# Patient Record
Sex: FEMALE | Race: WHITE | NOT HISPANIC OR LATINO | Employment: UNEMPLOYED | URBAN - NONMETROPOLITAN AREA
[De-identification: names, ages, dates, MRNs, and addresses within clinical notes are randomized per-mention and may not be internally consistent; named-entity substitution may affect disease eponyms.]

---

## 2017-02-03 NOTE — TELEPHONE ENCOUNTER
----- Message from Jami Jones sent at 2/3/2017  3:03 PM EST -----  Contact: PATIENT  PT NEEDS REFILL OF BIRTH CONTROL. I SCHEDULED HER FOR AN ANNUAL IN MARCH      [facility]      ? [facility] - SCHEDULED WITH AJAY

## 2017-02-06 RX ORDER — NORGESTIMATE AND ETHINYL ESTRADIOL 7DAYSX3 28
1 KIT ORAL DAILY
Qty: 28 TABLET | Refills: 0 | Status: SHIPPED | OUTPATIENT
Start: 2017-02-06 | End: 2017-07-19

## 2017-04-21 VITALS
RESPIRATION RATE: 22 BRPM | DIASTOLIC BLOOD PRESSURE: 72 MMHG | HEIGHT: 62 IN | BODY MASS INDEX: 23.92 KG/M2 | SYSTOLIC BLOOD PRESSURE: 105 MMHG | HEART RATE: 120 BPM | TEMPERATURE: 98.2 F | OXYGEN SATURATION: 100 % | WEIGHT: 130 LBS

## 2017-04-21 PROCEDURE — 99283 EMERGENCY DEPT VISIT LOW MDM: CPT

## 2017-04-22 ENCOUNTER — HOSPITAL ENCOUNTER (EMERGENCY)
Facility: HOSPITAL | Age: 22
Discharge: HOME | End: 2017-04-22
Attending: EMERGENCY MEDICINE | Admitting: EMERGENCY MEDICINE

## 2017-04-22 DIAGNOSIS — N39.0 URINARY TRACT INFECTION WITHOUT HEMATURIA, SITE UNSPECIFIED: Primary | ICD-10-CM

## 2017-04-22 DIAGNOSIS — R51.9 NONINTRACTABLE HEADACHE, UNSPECIFIED CHRONICITY PATTERN, UNSPECIFIED HEADACHE TYPE: ICD-10-CM

## 2017-04-22 LAB
BACTERIA UR QL AUTO: ABNORMAL /HPF
BILIRUB UR QL STRIP: NEGATIVE
CLARITY UR: CLEAR
COLOR UR: YELLOW
GLUCOSE UR STRIP-MCNC: NEGATIVE MG/DL
HGB UR QL STRIP.AUTO: ABNORMAL
HYALINE CASTS UR QL AUTO: ABNORMAL /LPF
KETONES UR QL STRIP: NEGATIVE
LEUKOCYTE ESTERASE UR QL STRIP.AUTO: ABNORMAL
NITRITE UR QL STRIP: NEGATIVE
PH UR STRIP.AUTO: 7.5 [PH] (ref 5–8)
PROT UR QL STRIP: ABNORMAL
RBC # UR: ABNORMAL /HPF
REF LAB TEST METHOD: ABNORMAL
SP GR UR STRIP: 1.01 (ref 1–1.03)
SQUAMOUS #/AREA URNS HPF: ABNORMAL /HPF
UROBILINOGEN UR QL STRIP: ABNORMAL
WBC UR QL AUTO: ABNORMAL /HPF

## 2017-04-22 PROCEDURE — 81001 URINALYSIS AUTO W/SCOPE: CPT | Performed by: EMERGENCY MEDICINE

## 2017-04-22 PROCEDURE — 87086 URINE CULTURE/COLONY COUNT: CPT | Performed by: EMERGENCY MEDICINE

## 2017-04-22 PROCEDURE — 25010000002 KETOROLAC TROMETHAMINE PER 15 MG: Performed by: EMERGENCY MEDICINE

## 2017-04-22 PROCEDURE — 96372 THER/PROPH/DIAG INJ SC/IM: CPT

## 2017-04-22 PROCEDURE — 87186 SC STD MICRODIL/AGAR DIL: CPT | Performed by: EMERGENCY MEDICINE

## 2017-04-22 PROCEDURE — 87077 CULTURE AEROBIC IDENTIFY: CPT | Performed by: EMERGENCY MEDICINE

## 2017-04-22 RX ORDER — CEFUROXIME AXETIL 250 MG/1
250 TABLET ORAL EVERY 12 HOURS SCHEDULED
Status: DISCONTINUED | OUTPATIENT
Start: 2017-04-22 | End: 2017-04-22 | Stop reason: HOSPADM

## 2017-04-22 RX ORDER — KETOROLAC TROMETHAMINE 30 MG/ML
60 INJECTION, SOLUTION INTRAMUSCULAR; INTRAVENOUS ONCE
Status: COMPLETED | OUTPATIENT
Start: 2017-04-22 | End: 2017-04-22

## 2017-04-22 RX ORDER — NAPROXEN 500 MG/1
500 TABLET ORAL 2 TIMES DAILY PRN
Qty: 14 TABLET | Refills: 0 | Status: SHIPPED | OUTPATIENT
Start: 2017-04-22 | End: 2017-07-19

## 2017-04-22 RX ORDER — CEFUROXIME AXETIL 250 MG/1
250 TABLET ORAL 2 TIMES DAILY
Qty: 20 TABLET | Refills: 0 | Status: SHIPPED | OUTPATIENT
Start: 2017-04-22 | End: 2017-07-19

## 2017-04-22 RX ORDER — BUTALBITAL, ACETAMINOPHEN AND CAFFEINE 50; 325; 40 MG/1; MG/1; MG/1
1 TABLET ORAL EVERY 6 HOURS PRN
Qty: 12 TABLET | Refills: 0 | Status: SHIPPED | OUTPATIENT
Start: 2017-04-22 | End: 2017-07-19

## 2017-04-22 RX ADMIN — KETOROLAC TROMETHAMINE 60 MG: 30 INJECTION, SOLUTION INTRAMUSCULAR at 01:03

## 2017-04-22 RX ADMIN — CEFUROXIME AXETIL 250 MG: 250 TABLET ORAL at 01:36

## 2017-04-22 NOTE — ED PROVIDER NOTES
TRIAGE CHIEF COMPLAINT:     Nursing and triage notes reviewed    Chief Complaint   Patient presents with   • Difficulty Urinating      HPI: Alyson James is a 21 y.o. female who presents to the emergency department complaining of difficulty and pain with urination for the past 2 days.  Patient states today she is started to develop some bilateral flank discomfort.  She is also had a diffuse pounding headache most prominent in the back of her head for the past 2 days as well.  Headache was gradual in onset.  Patient states that she has not taken anything for the pain in her back or her head since it began.  Small amount of hematuria this morning.  Denies fevers but does feel chilled.     REVIEW OF SYSTEMS: Otherwise negative unless stated above     PAST MEDICAL HISTORY:   Past Medical History:   Diagnosis Date   • Gout    • Kidney infection         FAMILY HISTORY:   Family History   Problem Relation Age of Onset   • Cancer Mother    • Arthritis Father    • Cancer Father    • Diabetes Paternal Aunt    • Migraines Paternal Aunt    • Diabetes Paternal Grandmother         SOCIAL HISTORY:   Social History     Social History   • Marital status: Single     Spouse name: N/A   • Number of children: N/A   • Years of education: N/A     Occupational History   • Not on file.     Social History Main Topics   • Smoking status: Current Every Day Smoker   • Smokeless tobacco: Not on file   • Alcohol use No   • Drug use: No   • Sexual activity: Not on file     Other Topics Concern   • Not on file     Social History Narrative        SURGICAL HISTORY:   History reviewed. No pertinent surgical history.     CURRENT MEDICATIONS:      Medication List      ASK your doctor about these medications          * TRINESSA (28) 0.18/0.215/0.25 MG-35 MCG per tablet   Generic drug:  norgestimate-ethinyl estradiol       * norgestimate-ethinyl estradiol 0.18/0.215/0.25 MG-35 MCG per tablet   Commonly known as:  TRINESSA (28)   Take 1 tablet  by mouth Daily.       * Notice:  This list has 2 medication(s) that are the same as other   medications prescribed for you. Read the directions carefully, and ask   your doctor or other care provider to review them with you.         ALLERGIES: Review of patient's allergies indicates no known allergies.     PHYSICAL EXAM:   VITAL SIGNS:   Vitals:    04/21/17 2320   BP: 105/72   Pulse: 120   Resp: 22   Temp: 98.2 °F (36.8 °C)   SpO2: 100%      CONSTITUTIONAL: Awake, oriented, appears non-toxic   HENT: Atraumatic, normocephalic, oral mucosa pink and moist, airway patent.   EYES: Conjunctiva clear   NECK: Trachea midline, non-tender, supple   CARDIOVASCULAR: Normal heart rate, Normal rhythm, No murmurs, rubs, gallops   PULMONARY/CHEST: Clear to auscultation, no rhonchi, wheezes, or rales. Symmetrical breath sounds. Non-tender.   ABDOMINAL: Non-distended, soft, non-tender - no rebound or guarding.  BACK: While bilateral flank discomfort to palpation.   NEUROLOGIC: Non-focal, moving all four extremities, no gross sensory or motor deficits.  Strength and sensation in the upper and lower extremities bilaterally.  EXTREMITIES: No clubbing, cyanosis, or edema   SKIN: Warm, Dry, No erythema, No rash     ED COURSE / MEDICAL DECISION MAKING:   Alyson James is a 21 y.o. female who presents to the emergency department for evaluation of pain with urination and headache.  Patient appears mildly uncomfortable on arrival and slightly tachycardic.  Vital signs otherwise unremarkable.  Urinalysis obtained reveals signs of a urinary tract infection.  I suspect her symptoms are all secondary of her UTI.  I don't feel imaging or laboratory tests are currently indicated.  Don't feel imaging the head is currently indicated.  Patient had symptomatic improvement with Toradol.  Neurological exam is unremarkable and no concern for subarachnoid hemorrhage or meningitis at this time.  We'll treat symptomatically and with antibiotic's  for the urinary infection and have patient follow-up with her primary care physician or return for worsening symptoms.    DECISION TO DISCHARGE/ADMIT: see ED care timeline     FINAL IMPRESSION:   1 -- urinary tract infection   2 -- headache  3 --     Electronically signed by: Paige Marcos MD, 4/22/2017 12:54 AM       Paige Marcos MD  04/22/17 0130

## 2017-04-22 NOTE — DISCHARGE INSTRUCTIONS
"Most recent vital signs: /72 (Patient Position: Sitting)  Pulse 120  Temp 98.2 °F (36.8 °C) (Oral)   Resp 22  Ht 62\" (157.5 cm)  Wt 130 lb (59 kg)  LMP 04/02/2017 (Exact Date)  SpO2 100%  BMI 23.78 kg/m2     Below you fill find any summaries of imaging results and further discharge instructions as discussed during your visit.     No orders to display        --PLEASE READ THESE DIRECTIONS IN FULL--     Our goal is to provide the best care we can AND to find any medical or surgical emergency while you are in the ER. If a medical or surgical emergency was not identified during your visit (or if the issue was resolved during the visit), following these directions for follow-up with a primary care provider and/or specialists and following the return precautions will be the safest and most effective way to protect your health after leaving the emergency room.     Therefore, in addition to the conversation we had in the room, please read all of the information in these discharge instructions, take medications as directed, and follow-up as directed.     You should seek immediate medical help for:     Worsening pain that is not helped with prescribed pain medicines and/or the recommended doses of over the counter pain medicines such as acetaminophen (Tylenol) or ibuprofen (Motrin/Advil)*.   New or worsening chest pain or trouble breathing   New or worsening headache, confusion, weakness, or visual changes   New or worsening fever (>100.4 F) that does not improve with the recommended doses of over the counter fever treatments such as acetaminophen (Tylenol) or ibuprofen (Motrin/Advil)* for more than a few hours.   Any other concerns that you feel could be life, limb, or eye-sight threatening     As a reminder, if you received any medicines or prescriptions for medicines that may be sedating (\"narcotics\", \"opioids\"), do NOT:   - operate any vehicles   - take if you are already tired   - take if you have had or " plan to have alcohol   - perform other responsibilities that require your full attention.     Common medications include, but are not limited to:   Opiates: Morphine, Norco, Lortab, Vicodin, Percocet, oxycodone, hydrocodone, Dilaudid, hydromorphone   Benzodiazepines: Ativan, Valium, alprazolam, lorazepam, diazepam,   Other sedating medications: promethazine, Phenergan, trazodone, Benadryl, hydroxyzine, Vistaril, diphenhydramine, zolpidem, and Ambien     *If you have any history of GI (stomach/intestinal) bleeding, allergic reactions, kidney problems, and/or certain heart problems or there is any chance you could be pregnant, and have been told to avoid NSAIDs (ibuprofen, naproxen, Aleve, Motrin, Advil) please avoid these medications. Please remember that prescribed pain medicines often contain Tylenol/acetaminophen, so make sure your total daily (24 hour) intake does not exceed 4 grams or 4000mg.

## 2017-04-24 LAB — BACTERIA SPEC AEROBE CULT: ABNORMAL

## 2017-07-19 ENCOUNTER — OFFICE VISIT (OUTPATIENT)
Dept: OBSTETRICS AND GYNECOLOGY | Facility: CLINIC | Age: 22
End: 2017-07-19

## 2017-07-19 VITALS
WEIGHT: 117 LBS | DIASTOLIC BLOOD PRESSURE: 62 MMHG | BODY MASS INDEX: 21.53 KG/M2 | HEIGHT: 62 IN | SYSTOLIC BLOOD PRESSURE: 124 MMHG

## 2017-07-19 DIAGNOSIS — Z3A.08 8 WEEKS GESTATION OF PREGNANCY: Primary | ICD-10-CM

## 2017-07-19 PROCEDURE — 99213 OFFICE O/P EST LOW 20 MIN: CPT | Performed by: NURSE PRACTITIONER

## 2017-07-19 RX ORDER — PROMETHAZINE HYDROCHLORIDE 25 MG/1
TABLET ORAL
Refills: 3 | COMMUNITY
Start: 2017-07-13 | End: 2018-02-12 | Stop reason: HOSPADM

## 2017-07-19 RX ORDER — CEPHALEXIN 500 MG/1
CAPSULE ORAL
Refills: 0 | COMMUNITY
Start: 2017-07-10 | End: 2017-08-21

## 2017-07-19 RX ORDER — HYDROCODONE BITARTRATE AND ACETAMINOPHEN 10; 325 MG/1; MG/1
TABLET ORAL
Refills: 0 | COMMUNITY
Start: 2017-07-13 | End: 2017-08-21

## 2017-07-19 RX ORDER — ONDANSETRON 8 MG/1
TABLET, ORALLY DISINTEGRATING ORAL
Refills: 3 | COMMUNITY
Start: 2017-07-13 | End: 2017-09-19

## 2017-07-19 RX ORDER — PRENATAL VIT NO.126/IRON/FOLIC 28MG-0.8MG
TABLET ORAL DAILY
COMMUNITY
End: 2020-02-21 | Stop reason: SDUPTHER

## 2017-07-19 NOTE — PROGRESS NOTES
"Chief Complaint   Patient presents with   • confirmation of pregnancy     LMP 17, was seen in [facility] ED for kidney stones, had scan there on 7/10/17 with IUP measuring 6w5d     Alyson James is a 21 y.o. year old  presenting to be seen for + pregnancy - EDC  = 8 2/7 wks gestation  C/O lower adm cramping and \"knot in stomach at around umbilcus - has been there since delivery in  - knot remains the same  C/O 1st trimester discomforts of pregnancy - including n/v - fatigue - lizama and irritability - some   Seen ER with back pain - rec'd antibiotics and pain meds - hydrocodone - some improvement  - has appt with Dr. 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     Previous pregnancy at [facility] 8#4 -no complications - no vac or forceps     Past Medical History:   Diagnosis Date   • Abnormal Pap smear of cervix    • Bladder infection    • Gout    • Kidney infection         Current Outpatient Prescriptions:   •  cephalexin (KEFLEX) 500 MG capsule, TK ONE C PO  QID, Disp: , Rfl: 0  •  HYDROcodone-acetaminophen (NORCO)  MG per tablet, TK 1 T PO Q 6 HOURS PRF PAIN, Disp: , Rfl: 0  •  ondansetron ODT (ZOFRAN-ODT) 8 MG disintegrating tablet, DIS 1 T PO Q 6 HOURS N, Disp: , Rfl: 3  •  Prenatal Vit-Fe Fumarate-FA (PRENATAL, CLASSIC, VITAMIN) 28-0.8 MG tablet tablet, Take  by mouth Daily., Disp: , Rfl:   •  promethazine (PHENERGAN) 25 MG tablet, TK 1 T PO Q 6 HOURS  N AND VOMITING, Disp: , Rfl: 3   No Known Allergies   Past Surgical History:   Procedure Laterality Date   • VAGINAL DELIVERY        Social History     Social History   • Marital status: Single     Spouse name: N/A   • Number of children: N/A   • Years of education: N/A     Occupational History   • Not on file.     Social History Main Topics   • Smoking status: Current Every Day Smoker   • Smokeless tobacco: Never Used   • Alcohol " "use No   • Drug use: No   • Sexual activity: Yes     Birth control/ protection: None     Other Topics Concern   • Not on file     Social History Narrative      Family History   Problem Relation Age of Onset   • Cancer Mother    • Arthritis Father    • Cancer Father    • Diabetes Paternal Aunt    • Migraines Paternal Aunt    • Diabetes Paternal Grandmother        The following portions of the patient's history were reviewed and updated as appropriate:problem list, current medications, allergies, past family history, past medical history, past social history and past surgical history.    Review of Systems  Pertinent items are noted in HPI, all other systems reviewed and negative    Objective    /62  Ht 62\" (157.5 cm)  Wt 117 lb (53.1 kg)  LMP 04/23/2017  Breastfeeding? No  BMI 21.4 kg/m2    Physical Exam  Psych: Altert and oriented to time, place and person  Mood and affect appropriate   General: well developed; well nourished  no acute distress   HEENT -  Tooth decay   Neck: The neck is supple and the trachea is midline  Lungs:  breathing is unlabored  Back: Neg CVAT  Abdomen: Soft, non-tender, no organomegaly  Lower Extremities: Full ROM      REVIEW OF CHART FROM ER  + IUP with [facility] 2-28-18  Renal stone and mild rt hydronephrosis        Assessment   IUP 1st trimester   Renal stone and mild hydronephrosis  Tooth decay      Plan  Keep appt with Dr. Marck Chaudhary NOB labs with option CF screening  Routine NOB education - including travel - nutrition - OTC meds - adeq rest and fluids   Encourage to decrease smoking or stop  Discussed [employer] - enc to schedule appt   Continue PNV   Office 4 wks call prn       This note was electronically signed.    Mally Russell CNM   July 19, 2017  "

## 2017-07-20 LAB
ABO GROUP BLD: (no result)
BASOPHILS # BLD AUTO: 0 X10E3/UL (ref 0–0.2)
BASOPHILS NFR BLD AUTO: 0 %
BLD GP AB SCN SERPL QL: NEGATIVE
EOSINOPHIL # BLD AUTO: 0.2 X10E3/UL (ref 0–0.4)
EOSINOPHIL NFR BLD AUTO: 3 %
ERYTHROCYTE [DISTWIDTH] IN BLOOD BY AUTOMATED COUNT: 13.9 % (ref 12.3–15.4)
HBV SURFACE AG SERPL QL IA: NEGATIVE
HCT VFR BLD AUTO: 40.4 % (ref 34–46.6)
HGB BLD-MCNC: 13.7 G/DL (ref 11.1–15.9)
HIV 1+2 AB+HIV1 P24 AG SERPL QL IA: NON REACTIVE
IMM GRANULOCYTES # BLD: 0 X10E3/UL (ref 0–0.1)
IMM GRANULOCYTES NFR BLD: 0 %
LYMPHOCYTES # BLD AUTO: 1.5 X10E3/UL (ref 0.7–3.1)
LYMPHOCYTES NFR BLD AUTO: 21 %
MCH RBC QN AUTO: 29.5 PG (ref 26.6–33)
MCHC RBC AUTO-ENTMCNC: 33.9 G/DL (ref 31.5–35.7)
MCV RBC AUTO: 87 FL (ref 79–97)
MONOCYTES # BLD AUTO: 0.4 X10E3/UL (ref 0.1–0.9)
MONOCYTES NFR BLD AUTO: 6 %
NEUTROPHILS # BLD AUTO: 5.2 X10E3/UL (ref 1.4–7)
NEUTROPHILS NFR BLD AUTO: 70 %
PLATELET # BLD AUTO: 218 X10E3/UL (ref 150–379)
RBC # BLD AUTO: 4.65 X10E6/UL (ref 3.77–5.28)
RH BLD: NEGATIVE
RPR SER QL: NON REACTIVE
RUBV IGG SERPL IA-ACNC: 3.09 INDEX
WBC # BLD AUTO: 7.3 X10E3/UL (ref 3.4–10.8)

## 2017-08-21 ENCOUNTER — TELEPHONE (OUTPATIENT)
Dept: OBSTETRICS AND GYNECOLOGY | Facility: CLINIC | Age: 22
End: 2017-08-21

## 2017-08-21 ENCOUNTER — ROUTINE PRENATAL (OUTPATIENT)
Dept: OBSTETRICS AND GYNECOLOGY | Facility: CLINIC | Age: 22
End: 2017-08-21

## 2017-08-21 VITALS — SYSTOLIC BLOOD PRESSURE: 118 MMHG | BODY MASS INDEX: 21.95 KG/M2 | WEIGHT: 120 LBS | DIASTOLIC BLOOD PRESSURE: 58 MMHG

## 2017-08-21 DIAGNOSIS — Z34.92 NORMAL PREGNANCY, SECOND TRIMESTER: ICD-10-CM

## 2017-08-21 DIAGNOSIS — Z3A.14 14 WEEKS GESTATION OF PREGNANCY: Primary | ICD-10-CM

## 2017-08-21 PROCEDURE — 99213 OFFICE O/P EST LOW 20 MIN: CPT | Performed by: NURSE PRACTITIONER

## 2017-08-21 NOTE — PROGRESS NOTES
"Chief Complaint   Patient presents with   • Routine Prenatal Visit     \"knot in stomach\", cramping        HPI  , 12w5d reports occas cramp - no bleeding - no cramp today   FOB has Baltazar parkinson white syndrome     ROS:  GI: Nausea - No; Constipation - No; Diarrhea - No    Neuro: Headache - No; Visual change - No        EXAM  General Appearance: relaxed  Lungs: Breathing unlabored  Abdomen:  Soft and non-tender - See flow sheet FHT's  LE: Neg edema    MDM  Impression: Supervision of pregnancy    Tests done today: none   Topics discussed: option for MSAFP next visit,  will schedule appt with PDC due to FOB + WPWS   Tests next visit: none     OB History      Para Term  AB TAB SAB Ectopic Multiple Living    2 1 1       1          Past Medical History:   Diagnosis Date   • Abnormal Pap smear of cervix    • Bladder infection    • Gout    • Kidney infection        Past Surgical History:   Procedure Laterality Date   • VAGINAL DELIVERY         Family History   Problem Relation Age of Onset   • Cancer Mother    • Arthritis Father    • Cancer Father    • Diabetes Paternal Aunt    • Migraines Paternal Aunt    • Diabetes Paternal Grandmother        Social History     Social History   • Marital status: Single     Spouse name: N/A   • Number of children: N/A   • Years of education: N/A     Occupational History   • Not on file.     Social History Main Topics   • Smoking status: Current Every Day Smoker   • Smokeless tobacco: Never Used   • Alcohol use No   • Drug use: No   • Sexual activity: Yes     Birth control/ protection: None     Other Topics Concern   • Not on file     Social History Narrative     "

## 2017-08-21 NOTE — TELEPHONE ENCOUNTER
Pt was seen in office today, called after she left stating that she was sick and had missed work on 8/4/17. She is requesting an excuse for that day. Can we give her one?

## 2017-08-25 ENCOUNTER — APPOINTMENT (OUTPATIENT)
Dept: WOMENS IMAGING | Facility: HOSPITAL | Age: 22
End: 2017-08-25

## 2017-08-31 ENCOUNTER — APPOINTMENT (OUTPATIENT)
Dept: WOMENS IMAGING | Facility: HOSPITAL | Age: 22
End: 2017-08-31

## 2017-08-31 ENCOUNTER — TELEPHONE (OUTPATIENT)
Dept: OBSTETRICS AND GYNECOLOGY | Facility: CLINIC | Age: 22
End: 2017-08-31

## 2017-09-19 ENCOUNTER — ROUTINE PRENATAL (OUTPATIENT)
Dept: OBSTETRICS AND GYNECOLOGY | Facility: CLINIC | Age: 22
End: 2017-09-19

## 2017-09-19 VITALS — DIASTOLIC BLOOD PRESSURE: 60 MMHG | WEIGHT: 126 LBS | BODY MASS INDEX: 23.05 KG/M2 | SYSTOLIC BLOOD PRESSURE: 120 MMHG

## 2017-09-19 DIAGNOSIS — Z34.82 ENCOUNTER FOR SUPERVISION OF OTHER NORMAL PREGNANCY IN SECOND TRIMESTER: Primary | ICD-10-CM

## 2017-09-19 DIAGNOSIS — K59.00 CONSTIPATION DURING PREGNANCY, SECOND TRIMESTER: ICD-10-CM

## 2017-09-19 DIAGNOSIS — O99.612 CONSTIPATION DURING PREGNANCY, SECOND TRIMESTER: ICD-10-CM

## 2017-09-19 PROBLEM — Z34.90 SUPERVISION OF NORMAL PREGNANCY: Status: ACTIVE | Noted: 2017-09-19

## 2017-09-19 PROCEDURE — 99213 OFFICE O/P EST LOW 20 MIN: CPT | Performed by: MIDWIFE

## 2017-09-19 RX ORDER — HYDROCODONE BITARTRATE AND ACETAMINOPHEN 7.5; 325 MG/1; MG/1
TABLET ORAL
Refills: 0 | COMMUNITY
Start: 2017-08-29 | End: 2017-09-19

## 2017-09-19 NOTE — PROGRESS NOTES
Chief Complaint   Patient presents with   • Routine Prenatal Visit     mild cramping        HPI: Alyson is a  currently at 16w6d who today reports the following: C/O mild cramping and constipation for the past several weeks. Smoking 5-10 cigs daily   Leaking - No; Heartburn - No.    ROS:   GI:   Nausea - YES; Constipation - YES;    Neuro:  Headache - No; Visual disturbances - No.    EXAM:   Vitals:  See prenatal flowsheet, /60, Wt +6#   Abdomen:   See prenatal flowsheet, soft, nontender, fundus @ U-3   Pelvic:  See prenatal flowsheet   Urine:  See prenatal flowsheet    Lab Results   Component Value Date    ABO B 2017    RH Negative 2017    ABSCRN Negative 2017       MDM:  Impression: Supervision of low risk pregnancy  Constipation in pregnancy   Tests done today: Declined Quad screen   Topics discussed: Constipation, increase water and fiber in diet. Stool softeners or fiber gummies BID PRN, May use fleets enema PRN   Smoking in pregnancy   Tests next visit: U/S for anatomic screening                RTO:                        3 weeks per pt request    This note was electronically signed.  Yani Barriga, APRN  2017

## 2017-10-10 ENCOUNTER — ROUTINE PRENATAL (OUTPATIENT)
Dept: OBSTETRICS AND GYNECOLOGY | Facility: CLINIC | Age: 22
End: 2017-10-10

## 2017-10-10 VITALS — BODY MASS INDEX: 23.59 KG/M2 | SYSTOLIC BLOOD PRESSURE: 110 MMHG | DIASTOLIC BLOOD PRESSURE: 70 MMHG | WEIGHT: 129 LBS

## 2017-10-10 DIAGNOSIS — Z34.82 ENCOUNTER FOR SUPERVISION OF OTHER NORMAL PREGNANCY IN SECOND TRIMESTER: Primary | ICD-10-CM

## 2017-10-10 DIAGNOSIS — F17.210 CIGARETTE SMOKER ONE HALF PACK A DAY OR LESS: ICD-10-CM

## 2017-10-10 PROCEDURE — 99213 OFFICE O/P EST LOW 20 MIN: CPT | Performed by: MIDWIFE

## 2017-10-10 NOTE — PROGRESS NOTES
Chief Complaint   Patient presents with   • Routine Prenatal Visit     Pt states she has a really bad cold and wants to know what she can take.       HPI: Alyson is a  currently at 19w6d who today reports the following: has had cold symptoms for several weeks. Alternates congestion and runny nose, non productive cough. Denies fever. Missed appts with [facility]C due to transportation.   Contractions - No; Leaking - No; Vaginal bleeding -  No; Swelling of extremities - No.    ROS:   GI:   Nausea - No; Constipation - has improved by taking stool softeners   Neuro:  Headache - No; Visual disturbances - No.    EXAM:   Vitals:  See prenatal flowsheet, /70, Wt +3#   Abdomen:   See prenatal flowsheet, soft, nontender   Pelvic:  See prenatal flowsheet   Urine:  See prenatal flowsheet    MDM:  Impression: Supervision of low risk pregnancy   Tests done today: U/S for anatomic screening - anatomy completely seen today   2 mm EIF noted in Left ventricle   Topics discussed: tobacco use  Importance of referral to PDC. and keeping appt.  Safe OTC med list given   Tests next visit: none   Next visit: 4 weeks     This note was electronically signed.  Yani Barriga, APRN  10/10/2017

## 2017-11-13 ENCOUNTER — ROUTINE PRENATAL (OUTPATIENT)
Dept: OBSTETRICS AND GYNECOLOGY | Facility: CLINIC | Age: 22
End: 2017-11-13

## 2017-11-13 VITALS — BODY MASS INDEX: 24.69 KG/M2 | WEIGHT: 135 LBS | SYSTOLIC BLOOD PRESSURE: 120 MMHG | DIASTOLIC BLOOD PRESSURE: 62 MMHG

## 2017-11-13 DIAGNOSIS — Z34.82 ENCOUNTER FOR SUPERVISION OF OTHER NORMAL PREGNANCY IN SECOND TRIMESTER: ICD-10-CM

## 2017-11-13 DIAGNOSIS — Z34.92 NORMAL PREGNANCY, SECOND TRIMESTER: Primary | ICD-10-CM

## 2017-11-13 PROCEDURE — 99213 OFFICE O/P EST LOW 20 MIN: CPT | Performed by: NURSE PRACTITIONER

## 2017-11-13 RX ORDER — ONDANSETRON 8 MG/1
TABLET, ORALLY DISINTEGRATING ORAL
Refills: 3 | Status: ON HOLD | COMMUNITY
Start: 2017-11-09 | End: 2018-02-10

## 2017-11-13 NOTE — PROGRESS NOTES
[id number]  Chief Complaint   Patient presents with   • Routine Prenatal Visit     c/o mood swings, hot flashes and headaches.         HPI  , 24w5d reports mood swings - ?? 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 due LE edema  Has not been scheduled with PDC    Good FM     ROS  /62  Wt 135 lb (61.2 kg)  LMP 2017  BMI 24.69 kg/m2 -See Prenatal Assessment    ROS:  GI: Nausea - No; Constipation - No; Diarrhea - No    Neuro: Headache - yes; Visual change - No      EXAM  General Appearance: no obvious distress- smiling - FOB in attendance  Lungs: Breathing unlabored  Abdomen:  See flow sheet for Fundal ht, FM, FHT's  LE: Neg edema    MDM  Impression:  Problems/Risk Pregnancy with Active Problems(s) &/or Complication(s)  previous u/s EIF   Tests done today: none   Topics discussed: Comfort measures for h/as - option for behavioral health - would like  -   Informed may do antidepressants / benefits /risk   continue to note good FM  T-dap &/or flu vaccination      Tests next visit: U/S to evaluate EIF or f/u with PDC as previously discussed prior visit - pt option - prefers to have done here     OB History      Para Term  AB Living    2 1 1   1    SAB TAB Ectopic Multiple Live Births        1          Past Medical History:   Diagnosis Date   • Abnormal Pap smear of cervix    • Bladder infection    • Gout    • Kidney infection        Past Surgical History:   Procedure Laterality Date   • VAGINAL DELIVERY         Family History   Problem Relation Age of Onset   • Cancer Mother    • Arthritis Father    • Cancer Father    • Diabetes Paternal Aunt    • Migraines Paternal Aunt    • Diabetes Paternal Grandmother        Social History     Social History   • Marital status: Single     Spouse name: N/A   • Number of children: N/A   • Years of education: N/A     Occupational History   • Not on file.     Social  History Main Topics   • Smoking status: Current Every Day Smoker   • Smokeless tobacco: Never Used   • Alcohol use No   • Drug use: No   • Sexual activity: Yes     Birth control/ protection: None     Other Topics Concern   • Not on file     Social History Narrative

## 2017-11-27 ENCOUNTER — ROUTINE PRENATAL (OUTPATIENT)
Dept: OBSTETRICS AND GYNECOLOGY | Facility: CLINIC | Age: 22
End: 2017-11-27

## 2017-11-27 VITALS — DIASTOLIC BLOOD PRESSURE: 56 MMHG | SYSTOLIC BLOOD PRESSURE: 114 MMHG | WEIGHT: 135 LBS | BODY MASS INDEX: 24.69 KG/M2

## 2017-11-27 DIAGNOSIS — K21.9 GASTROESOPHAGEAL REFLUX DISEASE WITHOUT ESOPHAGITIS: ICD-10-CM

## 2017-11-27 DIAGNOSIS — Z34.82 ENCOUNTER FOR SUPERVISION OF OTHER NORMAL PREGNANCY IN SECOND TRIMESTER: ICD-10-CM

## 2017-11-27 DIAGNOSIS — R53.83 FATIGUE, UNSPECIFIED TYPE: Primary | ICD-10-CM

## 2017-11-27 LAB
BASOPHILS # BLD AUTO: 0.03 10*3/MM3 (ref 0–0.2)
BASOPHILS NFR BLD AUTO: 0.3 % (ref 0–2.5)
EOSINOPHIL # BLD AUTO: 0.12 10*3/MM3 (ref 0–0.7)
EOSINOPHIL NFR BLD AUTO: 1.2 % (ref 0–7)
ERYTHROCYTE [DISTWIDTH] IN BLOOD BY AUTOMATED COUNT: 12.6 % (ref 11.5–14.5)
GLUCOSE 1H P 50 G GLC PO SERPL-MCNC: 110 MG/DL
HCT VFR BLD AUTO: 37.2 % (ref 37–47)
HGB BLD-MCNC: 12.4 G/DL (ref 12–16)
IMM GRANULOCYTES # BLD: 0.04 10*3/MM3 (ref 0–0.06)
IMM GRANULOCYTES NFR BLD: 0.4 % (ref 0–0.6)
LYMPHOCYTES # BLD AUTO: 1.33 10*3/MM3 (ref 0.6–3.4)
LYMPHOCYTES NFR BLD AUTO: 12.8 % (ref 10–50)
MCH RBC QN AUTO: 31.1 PG (ref 27–31)
MCHC RBC AUTO-ENTMCNC: 33.3 G/DL (ref 30–37)
MCV RBC AUTO: 93.2 FL (ref 81–99)
MONOCYTES # BLD AUTO: 0.53 10*3/MM3 (ref 0–0.9)
MONOCYTES NFR BLD AUTO: 5.1 % (ref 0–12)
NEUTROPHILS # BLD AUTO: 8.35 10*3/MM3 (ref 2–6.9)
NEUTROPHILS NFR BLD AUTO: 80.2 % (ref 37–80)
NRBC BLD AUTO-RTO: 0 /100 WBC (ref 0–0)
PLATELET # BLD AUTO: 101 10*3/MM3 (ref 130–400)
RBC # BLD AUTO: 3.99 10*6/MM3 (ref 4.2–5.4)
WBC # BLD AUTO: 10.4 10*3/MM3 (ref 4.8–10.8)

## 2017-11-27 PROCEDURE — 99213 OFFICE O/P EST LOW 20 MIN: CPT | Performed by: OBSTETRICS & GYNECOLOGY

## 2017-11-27 RX ORDER — RANITIDINE 150 MG/1
150 CAPSULE ORAL 2 TIMES DAILY
Qty: 60 CAPSULE | Refills: 6 | Status: SHIPPED | OUTPATIENT
Start: 2017-11-27 | End: 2018-11-27

## 2017-11-27 NOTE — PROGRESS NOTES
Chief Complaint   Patient presents with   • Routine Prenatal Visit     Patient complains of fatigue.        HPI: Alyson is a  currently at 26w5d who today reports the following:  Contractions - No; Leaking - No; Vaginal bleeding -  No; Heartburn - Yes  Pt doing well other than fatigue.  Pt with continued nausea.  Pt does have some reflux as well.  Pt denies any cramping or contractions.  Pt did glucola today.  ROS:   GI:   Nausea - No; Constipation - No; Diarrhea - No.   Neuro:  Headache - No; Visual disturbances - No.    EXAM:   Vitals:  See prenatal flowsheet as noted and reviewed   Abdomen:   See prenatal flowsheet as noted and reviewed   Pelvic:  See prenatal flowsheet as noted and reviewed   Urine:  See prenatal flowsheet as noted and reviewed     Lab Results   Component Value Date    ABO B 2017    RH Negative 2017    ABSCRN Negative 2017       MDM:  Impression: Supervision of low risk pregnancy  Rh negative  Fatigue  GERD in pregnancy   Nausea   Tests done today: GCT  HgB - pt to call for results; ?anemic given symptoms  U/S for Scan today to evaluate heart and outflow tracts; normal anatomy seen; no EIF noted.   Rx Zantac given   Topics discussed: kick counts and fetal movement   labor signs and symptoms   Tests next visit: Rhogam next visit     This note was electronically signed.  Roxana Wan M.D.

## 2017-12-07 ENCOUNTER — OFFICE VISIT (OUTPATIENT)
Dept: PSYCHIATRY | Facility: CLINIC | Age: 22
End: 2017-12-07

## 2017-12-07 DIAGNOSIS — F43.23 ADJUSTMENT DISORDER WITH MIXED ANXIETY AND DEPRESSED MOOD: Primary | ICD-10-CM

## 2017-12-07 PROCEDURE — 90791 PSYCH DIAGNOSTIC EVALUATION: CPT | Performed by: COUNSELOR

## 2017-12-07 NOTE — PROGRESS NOTES
".Subjective   Patient ID: Alyson James is a 22 y.o. female presenting to [facility] for initial evaluation with JAKE Crane.     Time: 1:45pm - 2:10pm    Chief Complaint: \"pregnant and having mood swings\"    Presenting Concern(s)   (include symptoms, intensity, and duration): Patient is a 22 year old female who presents today for initial evaluation. Patient was referred by OB MD HERSON Wan. Patient is single and currently 28 weeks pregnant with a boy who is expected to arrive in February. Patient states she has been experiencing periods of feeling deeply sad with periods of tearfulness or irritability on an almost daily basis. Patient reported that she feels a very low level of energy compared to her previous pregnancy. Patient states she has withdrawn from social relationships that were previously important to her. Patient states her depressed mood became evident after her mother  in  from lung cancer. Now her father has been diagnosed recently with prostate and lung cancer.       Treatment History (all levels of care):  Patient reports no history of inpatient or outpatient behavioral health treatment.         Interpersonal/Family Relationships: Patient is single, never . She is in a long-term relationship with her boyfriend who is the father of her unborn child. She was previously in a long-term relationship from high school with the father of her five year old son. Patient was raised in [place]. She grew up with both parents and one older sister. Patient described her relationship with her mother as \"very close\" prior to her mother's death. She reports feeling close to her father too, but finding herself pushing away from him after she found he had the same cancer than took her mother. Patient's older sister and her two children live with patient's father and help take care of him. Patient lives with her boyfriend, son, and boyfriend's mother. " "She and her boyfriend's mother work together. Patient states her mother's side of the family have all passed away due to complications of cancer and/or car accidents. Her father's side of the family is nonexistent in her life. She has a relationship with one aunt but that is all.       Family History  Mental Illness and/or Substance Abuse: Patient's mother with anxiety, sister with anxiety, and father with anxiety. Patient's aunt whom she is close with has been diagnosed and treatment for anxiety and derepression. Patient reports no family history of addiction or other mental health issues that she is aware of.     Patient reports relationship with family is fair. Patient was informed of the option to involve family in treatment at [facility] and was also encouraged to do so.     Personal/Social History: Patient's highest level of education is high school graduate. 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 Goals for the future include going to school for something related to the medical field, maybe nursing..       Experience: No    Abuse History: No      Verbal:     Patient reports none Emotional/Mental:     Patient reports none   Physical:     Patient reports none Sexual/Rape     Patient reports none       Legal History: No   Court-ordered: No    Arrests 0 When:  Where:    Incarceration 0 When:  Where:    Court 0 When:  Where:    Current charges 0 When:  Pending:    Past charges 0 When:  Record:          History of Substance Use:     Patient answered \"no\" to experiencing the one or more of the following problems related to substance use: trouble quitting, financial problems, increased thought about using, work and/or school problems, inability to stay off drugs and/or alcohol, relapse, family problems, cravings, feelings of guilt or remorse about use, times when used and/or drank alone, using more than intended, and black outs and/or " memory issues when using.       Substance Age Frequency Amount Method Last use   Nicotine 14 daily 1PPD - reduced from 2.5PPD prior to pregnancy cigarettes today   Alcohol        Marijuana        Benzo        Pain Pills        Cocaine        Meth        Heroin        Suboxone        Synthetics/Other:            History of DTs: no  History of Seizures: no      Objective   Mental Status Exam  Hygiene:  good  Dress:  casual  Attitude:  Cooperative  Motor Activity:  Appropriate  Speech:  Normal  Mood:  sad  Affect:  calm and pleasant  Thought Processes:  Goal directed  Thought Content:  normal  Suicidal Thoughts:  denies  Homicidal Thoughts:  denies  Crisis Safety Plan: Yes, to come to the emergency room.  Hallucinations:  denies      Patient identified the following problems:     Anxiety, depression and unresolved grief or loss      Short term goals: Develop rapport and encourage compliance with treatment plan and followup. The patient to contact this office, call 911, or present to the nearest emergency room should suicidal or homicidal ideations occur.    Long term goals: Patient will learn and utilized positive coping skills to avoid or manage high risk situations that threaten his/her sobriety from alcohol and other substances. Patient will develop a network of sober support in the community by attending and participating in abstinence-based support group meetings. Patient will be able to function at optimal levels without the need for continued treatment at this level of care.    Strengths: Motivated for treatment, Literate and Employed    Weaknesses:Poor social support and Poor coping skills    Resources/Assets: Employed, Stable Living Situation, Motivated for treatment  and Ability to read/write    Plan: Patient will continue in biweekly individual psychotherapy sessions as scheduled at [facility]. Patient to be assessed and monitored by NINFA Rhodes, or Fer Barakat  MD, for medication management. Patient will adhere to medication regimen as prescribed and report any adverse side effects. Patient will contact this office, call 911, or present to the nearest emergency room should suicidal or homicidal ideations occur. Therapist will use Motivation Interviewing, Cognitive Behavioral Therapy, and Solution Focused Therapy to assist patient with improving functioning, maintaining stability, and avoiding decompensation and the need for higher level of care.

## 2017-12-11 ENCOUNTER — CLINICAL SUPPORT (OUTPATIENT)
Dept: OBSTETRICS AND GYNECOLOGY | Facility: CLINIC | Age: 22
End: 2017-12-11

## 2017-12-11 VITALS
HEIGHT: 63 IN | WEIGHT: 138 LBS | SYSTOLIC BLOOD PRESSURE: 120 MMHG | DIASTOLIC BLOOD PRESSURE: 56 MMHG | BODY MASS INDEX: 24.45 KG/M2

## 2017-12-11 DIAGNOSIS — O26.893 RH NEGATIVE STATUS DURING PREGNANCY IN THIRD TRIMESTER: Primary | ICD-10-CM

## 2017-12-11 DIAGNOSIS — R79.89 ABNORMAL CBC: Primary | ICD-10-CM

## 2017-12-11 DIAGNOSIS — Z67.91 RH NEGATIVE STATUS DURING PREGNANCY IN THIRD TRIMESTER: Primary | ICD-10-CM

## 2017-12-11 LAB
BASOPHILS # BLD AUTO: 0.04 10*3/MM3 (ref 0–0.2)
BASOPHILS NFR BLD AUTO: 0.4 % (ref 0–2.5)
EOSINOPHIL # BLD AUTO: 0.21 10*3/MM3 (ref 0–0.7)
EOSINOPHIL NFR BLD AUTO: 2.2 % (ref 0–7)
ERYTHROCYTE [DISTWIDTH] IN BLOOD BY AUTOMATED COUNT: 12.6 % (ref 11.5–14.5)
HCT VFR BLD AUTO: 37.7 % (ref 37–47)
HGB BLD-MCNC: 12.6 G/DL (ref 12–16)
IMM GRANULOCYTES # BLD: 0.05 10*3/MM3 (ref 0–0.06)
IMM GRANULOCYTES NFR BLD: 0.5 % (ref 0–0.6)
LYMPHOCYTES # BLD AUTO: 1.6 10*3/MM3 (ref 0.6–3.4)
LYMPHOCYTES NFR BLD AUTO: 16.4 % (ref 10–50)
MCH RBC QN AUTO: 31.3 PG (ref 27–31)
MCHC RBC AUTO-ENTMCNC: 33.4 G/DL (ref 30–37)
MCV RBC AUTO: 93.8 FL (ref 81–99)
MONOCYTES # BLD AUTO: 0.7 10*3/MM3 (ref 0–0.9)
MONOCYTES NFR BLD AUTO: 7.2 % (ref 0–12)
NEUTROPHILS # BLD AUTO: 7.16 10*3/MM3 (ref 2–6.9)
NEUTROPHILS NFR BLD AUTO: 73.3 % (ref 37–80)
NRBC BLD AUTO-RTO: 0 /100 WBC (ref 0–0)
PLATELET # BLD AUTO: 95 10*3/MM3 (ref 130–400)
RBC # BLD AUTO: 4.02 10*6/MM3 (ref 4.2–5.4)
WBC # BLD AUTO: 9.76 10*3/MM3 (ref 4.8–10.8)

## 2017-12-11 PROCEDURE — 96372 THER/PROPH/DIAG INJ SC/IM: CPT | Performed by: OBSTETRICS & GYNECOLOGY

## 2018-01-05 ENCOUNTER — ROUTINE PRENATAL (OUTPATIENT)
Dept: OBSTETRICS AND GYNECOLOGY | Facility: CLINIC | Age: 23
End: 2018-01-05

## 2018-01-05 VITALS — WEIGHT: 147 LBS | DIASTOLIC BLOOD PRESSURE: 60 MMHG | SYSTOLIC BLOOD PRESSURE: 122 MMHG | BODY MASS INDEX: 26.04 KG/M2

## 2018-01-05 DIAGNOSIS — Z34.83 ENCOUNTER FOR SUPERVISION OF OTHER NORMAL PREGNANCY IN THIRD TRIMESTER: Primary | ICD-10-CM

## 2018-01-05 PROCEDURE — 99213 OFFICE O/P EST LOW 20 MIN: CPT | Performed by: MIDWIFE

## 2018-01-05 NOTE — PROGRESS NOTES
Chief Complaint   Patient presents with   • Routine Prenatal Visit     no complaints        HPI: Alyson is a  currently at 32w2d who today reports the following:  Contractions - No; Leaking - No; Vaginal bleeding -  No; Swelling of extremities - No.  Baby is active. Having some intermittent lower groin pain since last visit. Sometimes it is sharp or dull and achy.    ROS:   GI:   Nausea - No; Constipation - No   Neuro:  Headache - No; Visual disturbances - No.    EXAM:   Vitals:  See prenatal flowsheet, /60, Wt +8#   Abdomen:   See prenatal flowsheet, soft, nontender   Pelvic:  See prenatal flowsheet   Urine:  See prenatal flowsheet    MDM:  Impression: Supervision of low risk pregnancy   Tests done today: none   Topics discussed: kick counts and fetal movement   labor signs and symptoms  Tylenol PRN, maternity support garment or belt   Tests next visit: U/S for EFW   Next visit: 2 weeks     This note was electronically signed.  Yani Barriga, APRN  2018

## 2018-01-25 ENCOUNTER — ROUTINE PRENATAL (OUTPATIENT)
Dept: OBSTETRICS AND GYNECOLOGY | Facility: CLINIC | Age: 23
End: 2018-01-25

## 2018-01-25 VITALS — SYSTOLIC BLOOD PRESSURE: 126 MMHG | WEIGHT: 154 LBS | DIASTOLIC BLOOD PRESSURE: 70 MMHG | BODY MASS INDEX: 27.28 KG/M2

## 2018-01-25 DIAGNOSIS — Z34.83 ENCOUNTER FOR SUPERVISION OF OTHER NORMAL PREGNANCY IN THIRD TRIMESTER: ICD-10-CM

## 2018-01-25 DIAGNOSIS — Z36.85 ANTENATAL SCREENING FOR STREPTOCOCCUS B: Primary | ICD-10-CM

## 2018-01-25 DIAGNOSIS — O23.43 UTI (URINARY TRACT INFECTION) DURING PREGNANCY, THIRD TRIMESTER: ICD-10-CM

## 2018-01-25 DIAGNOSIS — Z34.93 NORMAL PREGNANCY, THIRD TRIMESTER: ICD-10-CM

## 2018-01-25 PROCEDURE — 99213 OFFICE O/P EST LOW 20 MIN: CPT | Performed by: NURSE PRACTITIONER

## 2018-01-25 RX ORDER — CEPHALEXIN 500 MG/1
500 CAPSULE ORAL 3 TIMES DAILY
Qty: 30 CAPSULE | Refills: 0 | Status: SHIPPED | OUTPATIENT
Start: 2018-01-25 | End: 2018-02-04

## 2018-01-25 NOTE — PROGRESS NOTES
Chief Complaint   Patient presents with   • Routine Prenatal Visit     GBS and EFW scan today, c/o swelling in feet and legs also having pelvic pain, + blood on urine dip         HPI  , 35w1d reports feeling bad -started yesterday - was at work and left - rested at home. Needs note for off work yesterday - also considering maternity leave   C/O S/S UTI urgency and frequency  - has hx of UTI's as well as pyelonephritis   C/O LE edema in ankles and it hurts - does resolve with rest but returns with any activity - started Monday   C/O pain in stomach - (described) round ligament today x 1  & did have 1 RUQ pain yesterday - none since  Did have some abd. Tightening yesterday - but no ctx;s or cramps today    FOB questions when we would be able to do induction      ROS  /70  Wt 69.9 kg (154 lb)  LMP 2017  BMI 27.28 kg/m2 -See Prenatal Assessment    ROS:  GI: Nausea - No; Constipation - No; Diarrhea - No    Neuro: Headache - No; Visual change - No      EXAM  General Appearance: relaxed - no obvious distress  Lungs: Breathing unlabored  Back:  Verbalized pain/left CVAT - stated similar to previous kidney infection   Abdomen: soft and non-tender   See flow sheet for Fundal ht, FM, FHT's  LE: generalized edema of ankles     MDM  Impression:  Problems/Risks: Pregnancy with Active Problems(s) &/or Complication(s)  Hematuria / CVAT / hx UTI's and pyelonephrits   benign edema   Tests done today: U/S  & rev'd results    Topics discussed: CC U/A  - start keflex - rev'd s/s pyelonephritis   Importance not to miss appts   Continue to note good FM   Preventive measures of LE edema   Note for work and instructed maternity leave is encouraged    Tests next visit: CBC with peripheral smear     OB History      Para Term  AB Living    2 1 1   1    SAB TAB Ectopic Multiple Live Births        1          Past Medical History:   Diagnosis Date   • Abnormal Pap smear of cervix    • Bladder infection    •  Gout    • Kidney infection        Past Surgical History:   Procedure Laterality Date   • VAGINAL DELIVERY         Family History   Problem Relation Age of Onset   • Cancer Mother    • Arthritis Father    • Cancer Father    • Diabetes Paternal Aunt    • Migraines Paternal Aunt    • Diabetes Paternal Grandmother        Social History     Social History   • Marital status: Single     Spouse name: N/A   • Number of children: N/A   • Years of education: N/A     Occupational History   • Not on file.     Social History Main Topics   • Smoking status: Current Every Day Smoker     Packs/day: 0.50     Types: Cigarettes   • Smokeless tobacco: Never Used   • Alcohol use No   • Drug use: No   • Sexual activity: Yes     Partners: Male     Birth control/ protection: None     Other Topics Concern   • Not on file     Social History Narrative

## 2018-01-27 LAB — GP B STREP DNA SPEC QL NAA+PROBE: NEGATIVE

## 2018-01-31 ENCOUNTER — ROUTINE PRENATAL (OUTPATIENT)
Dept: OBSTETRICS AND GYNECOLOGY | Facility: CLINIC | Age: 23
End: 2018-01-31

## 2018-01-31 VITALS — WEIGHT: 147 LBS | DIASTOLIC BLOOD PRESSURE: 70 MMHG | SYSTOLIC BLOOD PRESSURE: 116 MMHG | BODY MASS INDEX: 26.04 KG/M2

## 2018-01-31 DIAGNOSIS — Z34.83 ENCOUNTER FOR SUPERVISION OF OTHER NORMAL PREGNANCY IN THIRD TRIMESTER: ICD-10-CM

## 2018-01-31 PROCEDURE — 99213 OFFICE O/P EST LOW 20 MIN: CPT | Performed by: OBSTETRICS & GYNECOLOGY

## 2018-01-31 NOTE — PROGRESS NOTES
Chief Complaint   Patient presents with   • Routine Prenatal Visit     No Complaints, Good Fetal Movement        HPI:   , 36w0d gestation reports doing well, couldn't tolerate Keflex--UA negative today    ROS:  See Prenatal Episode/Flowsheet  /70  Wt 66.7 kg (147 lb)  LMP 2017  BMI 26.04 kg/m2     EXAM:  EXTREMITIES:  No swelling-See Prenatal Episode/Flowsheet    ABDOMEN:  FHTs/Movement noted-See Prenatal Episode/Flowsheet    URINE GLUCOSE/PROTEIN:  See Prenatal Episode/Flowsheet    PELVIC EXAM:  See Prenatal Episode/Flowsheet  CV:  Lungs:    MDM:    Lab Results   Component Value Date    HGB 12.6 2017    HEPBSAG Negative 2017    ABO B 2017    RH Negative 2017    ABSCRN Negative 2017    TLA1PMG5 Non Reactive 2017    URINECX 50,000 CFU/mL Escherichia coli (A) 2017       U/S: 32.5% @ 35 weeks  1. IUP 36w0d  2. Routine care , Culture urine today, hold off on ABX

## 2018-02-02 LAB
BACTERIA UR CULT: NO GROWTH
BACTERIA UR CULT: NORMAL

## 2018-02-08 ENCOUNTER — ROUTINE PRENATAL (OUTPATIENT)
Dept: OBSTETRICS AND GYNECOLOGY | Facility: CLINIC | Age: 23
End: 2018-02-08

## 2018-02-08 ENCOUNTER — RESULTS ENCOUNTER (OUTPATIENT)
Dept: OBSTETRICS AND GYNECOLOGY | Facility: CLINIC | Age: 23
End: 2018-02-08

## 2018-02-08 VITALS — SYSTOLIC BLOOD PRESSURE: 118 MMHG | BODY MASS INDEX: 26.39 KG/M2 | DIASTOLIC BLOOD PRESSURE: 70 MMHG | WEIGHT: 149 LBS

## 2018-02-08 DIAGNOSIS — O99.119 THROMBOCYTOPENIA AFFECTING PREGNANCY, ANTEPARTUM (HCC): ICD-10-CM

## 2018-02-08 DIAGNOSIS — Z34.83 ENCOUNTER FOR SUPERVISION OF OTHER NORMAL PREGNANCY IN THIRD TRIMESTER: ICD-10-CM

## 2018-02-08 DIAGNOSIS — N30.01 ACUTE CYSTITIS WITH HEMATURIA: ICD-10-CM

## 2018-02-08 DIAGNOSIS — Z34.93 NORMAL PREGNANCY, THIRD TRIMESTER: Primary | ICD-10-CM

## 2018-02-08 DIAGNOSIS — D69.6 THROMBOCYTOPENIA AFFECTING PREGNANCY, ANTEPARTUM (HCC): ICD-10-CM

## 2018-02-08 LAB
BASOPHILS # BLD AUTO: 0.03 10*3/MM3 (ref 0–0.2)
BASOPHILS NFR BLD AUTO: 0.4 % (ref 0–2.5)
EOSINOPHIL # BLD AUTO: 0.19 10*3/MM3 (ref 0–0.7)
EOSINOPHIL NFR BLD AUTO: 2.3 % (ref 0–7)
ERYTHROCYTE [DISTWIDTH] IN BLOOD BY AUTOMATED COUNT: 12.5 % (ref 11.5–14.5)
HCT VFR BLD AUTO: 34.8 % (ref 37–47)
HGB BLD-MCNC: 11.6 G/DL (ref 12–16)
IMM GRANULOCYTES # BLD: 0.03 10*3/MM3 (ref 0–0.06)
IMM GRANULOCYTES NFR BLD: 0.4 % (ref 0–0.6)
LYMPHOCYTES # BLD AUTO: 1.41 10*3/MM3 (ref 0.6–3.4)
LYMPHOCYTES NFR BLD AUTO: 16.9 % (ref 10–50)
MCH RBC QN AUTO: 29.7 PG (ref 27–31)
MCHC RBC AUTO-ENTMCNC: 33.3 G/DL (ref 30–37)
MCV RBC AUTO: 89 FL (ref 81–99)
MONOCYTES # BLD AUTO: 0.74 10*3/MM3 (ref 0–0.9)
MONOCYTES NFR BLD AUTO: 8.9 % (ref 0–12)
NEUTROPHILS # BLD AUTO: 5.95 10*3/MM3 (ref 2–6.9)
NEUTROPHILS NFR BLD AUTO: 71.1 % (ref 37–80)
NRBC BLD AUTO-RTO: 0 /100 WBC (ref 0–0)
PLATELET # BLD AUTO: 103 10*3/MM3 (ref 130–400)
RBC # BLD AUTO: 3.91 10*6/MM3 (ref 4.2–5.4)
WBC # BLD AUTO: 8.35 10*3/MM3 (ref 4.8–10.8)

## 2018-02-08 PROCEDURE — 99213 OFFICE O/P EST LOW 20 MIN: CPT | Performed by: NURSE PRACTITIONER

## 2018-02-08 RX ORDER — NITROFURANTOIN 25; 75 MG/1; MG/1
100 CAPSULE ORAL 2 TIMES DAILY
Qty: 14 CAPSULE | Refills: 0 | Status: ON HOLD | OUTPATIENT
Start: 2018-02-08 | End: 2018-02-10

## 2018-02-08 NOTE — PROGRESS NOTES
Chief Complaint   Patient presents with   • Routine Prenatal Visit     No Complaints, Good Fetal Movement, + blood on urine dip         HPI  , 37w1d reports doing well - good FM - some contraction and want exam - no bleeding or fluid leaking.  Drinking lots of water - does have s/s UTI /urgency / frequency  & hx of UTI and feels positive UTI     ROS  /70  Wt 67.6 kg (149 lb)  LMP 2017  BMI 26.39 kg/m2 -See Prenatal Assessment    ROS:  GI: Nausea - No; Constipation - No; Diarrhea - No    Neuro: Headache - No; Visual change - No      EXAM  General Appearance: no distress noted   Lungs: Breathing unlabored  Abdomen:  See flow sheet for Fundal ht, FM, FHT's  LE: Neg edema  V/E  1 thick soft ballotable - no blood show      MDM  Impression:  Problems/Risks: Pregnancy with Active Problems(s) &/or Complication(s)  UTI   thrombocytopenia    Tests done today: CC u/a    CBC with peripheral smear  + smoker   Topics discussed: S/S labor and adeq FM/Kick Counts  option for tx of UTI now or await culture - states needs tx now - macrobid - encourage adeq water and fluids  Encourage no smoking   encouraged questions - call prn    Tests next visit: none     OB History      Para Term  AB Living    2 1 1   1    SAB TAB Ectopic Multiple Live Births        1          Past Medical History:   Diagnosis Date   • Abnormal Pap smear of cervix    • Bladder infection    • Gout    • Kidney infection        Past Surgical History:   Procedure Laterality Date   • VAGINAL DELIVERY         Family History   Problem Relation Age of Onset   • Cancer Mother    • Arthritis Father    • Cancer Father    • Diabetes Paternal Aunt    • Migraines Paternal Aunt    • Diabetes Paternal Grandmother        Social History     Social History   • Marital status: Single     Spouse name: N/A   • Number of children: N/A   • Years of education: N/A     Occupational History   • Not on file.     Social History Main Topics   • Smoking  status: Current Every Day Smoker     Packs/day: 0.50     Types: Cigarettes   • Smokeless tobacco: Never Used   • Alcohol use No   • Drug use: No   • Sexual activity: Yes     Partners: Male     Birth control/ protection: None     Other Topics Concern   • Not on file     Social History Narrative

## 2018-02-10 ENCOUNTER — ANESTHESIA EVENT (OUTPATIENT)
Dept: LABOR AND DELIVERY | Facility: HOSPITAL | Age: 23
End: 2018-02-10

## 2018-02-10 ENCOUNTER — ANESTHESIA (OUTPATIENT)
Dept: LABOR AND DELIVERY | Facility: HOSPITAL | Age: 23
End: 2018-02-10

## 2018-02-10 ENCOUNTER — HOSPITAL ENCOUNTER (INPATIENT)
Facility: HOSPITAL | Age: 23
LOS: 2 days | Discharge: HOME | End: 2018-02-12
Attending: OBSTETRICS & GYNECOLOGY | Admitting: OBSTETRICS & GYNECOLOGY

## 2018-02-10 DIAGNOSIS — Z3A.37 37 WEEKS GESTATION OF PREGNANCY: ICD-10-CM

## 2018-02-10 PROBLEM — O99.113 GESTATIONAL THROMBOCYTOPENIA WITHOUT HEMORRHAGE IN THIRD TRIMESTER (HCC): Status: ACTIVE | Noted: 2018-02-10

## 2018-02-10 PROBLEM — O99.013 ANEMIA IN PREGNANCY, THIRD TRIMESTER: Status: RESOLVED | Noted: 2018-02-10 | Resolved: 2018-02-10

## 2018-02-10 PROBLEM — O99.013 ANEMIA IN PREGNANCY, THIRD TRIMESTER: Status: ACTIVE | Noted: 2018-02-10

## 2018-02-10 PROBLEM — D69.6 GESTATIONAL THROMBOCYTOPENIA WITHOUT HEMORRHAGE IN THIRD TRIMESTER (HCC): Status: ACTIVE | Noted: 2018-02-10

## 2018-02-10 PROBLEM — D69.6 GESTATIONAL THROMBOCYTOPENIA WITHOUT HEMORRHAGE IN THIRD TRIMESTER (HCC): Status: RESOLVED | Noted: 2018-02-10 | Resolved: 2018-02-10

## 2018-02-10 PROBLEM — Z34.90 SUPERVISION OF NORMAL PREGNANCY: Status: RESOLVED | Noted: 2017-09-19 | Resolved: 2018-02-10

## 2018-02-10 PROBLEM — O99.113 GESTATIONAL THROMBOCYTOPENIA WITHOUT HEMORRHAGE IN THIRD TRIMESTER (HCC): Status: RESOLVED | Noted: 2018-02-10 | Resolved: 2018-02-10

## 2018-02-10 PROBLEM — R82.5 POSITIVE URINE DRUG SCREEN: Status: ACTIVE | Noted: 2018-02-10

## 2018-02-10 LAB
ABO GROUP BLD: NORMAL
ABO GROUP BLD: NORMAL
AMPHET+METHAMPHET UR QL: NEGATIVE
AMPHETAMINES UR QL: NEGATIVE
ANTI-D: NORMAL
BACTERIA UR CULT: NORMAL
BACTERIA UR CULT: NORMAL
BACTERIA UR QL AUTO: ABNORMAL /HPF
BARBITURATES UR QL SCN: NEGATIVE
BENZODIAZ UR QL SCN: NEGATIVE
BILIRUB BLD-MCNC: NEGATIVE MG/DL
BILIRUB UR QL STRIP: NEGATIVE
BLD GP AB SCN SERPL QL: POSITIVE
BUPRENORPHINE SERPL-MCNC: NEGATIVE NG/ML
CANNABINOIDS SERPL QL: NEGATIVE
CLARITY UR: ABNORMAL
CLARITY, POC: CLEAR
COCAINE UR QL: NEGATIVE
COLOR UR: ABNORMAL
COLOR UR: YELLOW
DEPRECATED RDW RBC AUTO: 40.5 FL (ref 37–54)
ERYTHROCYTE [DISTWIDTH] IN BLOOD BY AUTOMATED COUNT: 12.8 % (ref 11.5–14.5)
GLUCOSE UR STRIP-MCNC: NEGATIVE MG/DL
GLUCOSE UR STRIP-MCNC: NEGATIVE MG/DL
HCT VFR BLD AUTO: 35.6 % (ref 37–47)
HGB BLD-MCNC: 12.1 G/DL (ref 12–16)
HGB UR QL STRIP.AUTO: ABNORMAL
HYALINE CASTS UR QL AUTO: ABNORMAL /LPF
KETONES UR QL STRIP: NEGATIVE
KETONES UR QL: NEGATIVE
LEUKOCYTE EST, POC: NEGATIVE
LEUKOCYTE ESTERASE UR QL STRIP.AUTO: ABNORMAL
MCH RBC QN AUTO: 29.7 PG (ref 27–31)
MCHC RBC AUTO-ENTMCNC: 34 G/DL (ref 30–37)
MCV RBC AUTO: 87.5 FL (ref 81–99)
METHADONE UR QL SCN: NEGATIVE
NITRITE UR QL STRIP: NEGATIVE
NITRITE UR-MCNC: NEGATIVE MG/ML
OPIATES UR QL: POSITIVE
OXYCODONE UR QL SCN: NEGATIVE
PCP UR QL SCN: NEGATIVE
PH UR STRIP.AUTO: 7 [PH] (ref 5–8)
PH UR: 7.5 [PH] (ref 5–8)
PLATELET # BLD AUTO: 113 10*3/MM3 (ref 130–400)
PMV BLD AUTO: 12.5 FL (ref 6–12)
PROPOXYPH UR QL: NEGATIVE
PROT UR QL STRIP: NEGATIVE
PROT UR STRIP-MCNC: NEGATIVE MG/DL
RBC # BLD AUTO: 4.07 10*6/MM3 (ref 4.2–5.4)
RBC # UR STRIP: ABNORMAL /UL
RBC # UR: ABNORMAL /HPF
REF LAB TEST METHOD: ABNORMAL
RH BLD: NEGATIVE
RH BLD: NEGATIVE
SP GR UR STRIP: 1.01 (ref 1–1.03)
SP GR UR: 1 (ref 1–1.03)
SQUAMOUS #/AREA URNS HPF: ABNORMAL /HPF
TRANS CELLS #/AREA URNS HPF: ABNORMAL /HPF
TRICYCLICS UR QL SCN: NEGATIVE
UROBILINOGEN UR QL STRIP: ABNORMAL
UROBILINOGEN UR QL: NORMAL
WBC NRBC COR # BLD: 12.55 10*3/MM3 (ref 4.8–10.8)
WBC UR QL AUTO: ABNORMAL /HPF

## 2018-02-10 PROCEDURE — 86901 BLOOD TYPING SEROLOGIC RH(D): CPT | Performed by: OBSTETRICS & GYNECOLOGY

## 2018-02-10 PROCEDURE — 25010000002 ROPIVACAINE PER 1 MG: Performed by: NURSE ANESTHETIST, CERTIFIED REGISTERED

## 2018-02-10 PROCEDURE — 86901 BLOOD TYPING SEROLOGIC RH(D): CPT

## 2018-02-10 PROCEDURE — 86850 RBC ANTIBODY SCREEN: CPT | Performed by: OBSTETRICS & GYNECOLOGY

## 2018-02-10 PROCEDURE — 86920 COMPATIBILITY TEST SPIN: CPT

## 2018-02-10 PROCEDURE — 80307 DRUG TEST PRSMV CHEM ANLYZR: CPT | Performed by: OBSTETRICS & GYNECOLOGY

## 2018-02-10 PROCEDURE — 80306 DRUG TEST PRSMV INSTRMNT: CPT | Performed by: OBSTETRICS & GYNECOLOGY

## 2018-02-10 PROCEDURE — 59410 OBSTETRICAL CARE: CPT | Performed by: OBSTETRICS & GYNECOLOGY

## 2018-02-10 PROCEDURE — 87086 URINE CULTURE/COLONY COUNT: CPT | Performed by: OBSTETRICS & GYNECOLOGY

## 2018-02-10 PROCEDURE — 86922 COMPATIBILITY TEST ANTIGLOB: CPT

## 2018-02-10 PROCEDURE — C1755 CATHETER, INTRASPINAL: HCPCS | Performed by: NURSE ANESTHETIST, CERTIFIED REGISTERED

## 2018-02-10 PROCEDURE — 86900 BLOOD TYPING SEROLOGIC ABO: CPT | Performed by: OBSTETRICS & GYNECOLOGY

## 2018-02-10 PROCEDURE — 25010000002 FENTANYL CITRATE (PF) 250 MCG/5ML SOLUTION 5 ML AMPULE: Performed by: NURSE ANESTHETIST, CERTIFIED REGISTERED

## 2018-02-10 PROCEDURE — 81002 URINALYSIS NONAUTO W/O SCOPE: CPT | Performed by: OBSTETRICS & GYNECOLOGY

## 2018-02-10 PROCEDURE — 85027 COMPLETE CBC AUTOMATED: CPT | Performed by: OBSTETRICS & GYNECOLOGY

## 2018-02-10 PROCEDURE — 0KQM0ZZ REPAIR PERINEUM MUSCLE, OPEN APPROACH: ICD-10-PCS | Performed by: OBSTETRICS & GYNECOLOGY

## 2018-02-10 PROCEDURE — 81001 URINALYSIS AUTO W/SCOPE: CPT | Performed by: OBSTETRICS & GYNECOLOGY

## 2018-02-10 PROCEDURE — 51703 INSERT BLADDER CATH COMPLEX: CPT

## 2018-02-10 PROCEDURE — 86870 RBC ANTIBODY IDENTIFICATION: CPT | Performed by: OBSTETRICS & GYNECOLOGY

## 2018-02-10 PROCEDURE — 86900 BLOOD TYPING SEROLOGIC ABO: CPT

## 2018-02-10 PROCEDURE — 59025 FETAL NON-STRESS TEST: CPT

## 2018-02-10 RX ORDER — ONDANSETRON 2 MG/ML
4 INJECTION INTRAMUSCULAR; INTRAVENOUS EVERY 6 HOURS PRN
Status: DISCONTINUED | OUTPATIENT
Start: 2018-02-10 | End: 2018-02-10

## 2018-02-10 RX ORDER — ONDANSETRON 2 MG/ML
4 INJECTION INTRAMUSCULAR; INTRAVENOUS ONCE AS NEEDED
Status: DISCONTINUED | OUTPATIENT
Start: 2018-02-10 | End: 2018-02-10

## 2018-02-10 RX ORDER — ZOLPIDEM TARTRATE 5 MG/1
5 TABLET ORAL NIGHTLY PRN
Status: CANCELLED | OUTPATIENT
Start: 2018-02-10 | End: 2018-02-20

## 2018-02-10 RX ORDER — IBUPROFEN 600 MG/1
600 TABLET ORAL EVERY 6 HOURS PRN
Status: DISCONTINUED | OUTPATIENT
Start: 2018-02-10 | End: 2018-02-12 | Stop reason: HOSPADM

## 2018-02-10 RX ORDER — IBUPROFEN 600 MG/1
600 TABLET ORAL EVERY 6 HOURS PRN
Status: DISCONTINUED | OUTPATIENT
Start: 2018-02-10 | End: 2018-02-10 | Stop reason: HOSPADM

## 2018-02-10 RX ORDER — BISACODYL 10 MG
10 SUPPOSITORY, RECTAL RECTAL DAILY PRN
Status: CANCELLED | OUTPATIENT
Start: 2018-02-11

## 2018-02-10 RX ORDER — PROMETHAZINE HYDROCHLORIDE 12.5 MG/1
12.5 SUPPOSITORY RECTAL EVERY 6 HOURS PRN
Status: DISCONTINUED | OUTPATIENT
Start: 2018-02-10 | End: 2018-02-10 | Stop reason: HOSPADM

## 2018-02-10 RX ORDER — BISACODYL 10 MG
10 SUPPOSITORY, RECTAL RECTAL DAILY PRN
Status: DISCONTINUED | OUTPATIENT
Start: 2018-02-11 | End: 2018-02-12 | Stop reason: HOSPADM

## 2018-02-10 RX ORDER — OXYCODONE HYDROCHLORIDE AND ACETAMINOPHEN 5; 325 MG/1; MG/1
1 TABLET ORAL EVERY 4 HOURS PRN
Status: CANCELLED | OUTPATIENT
Start: 2018-02-11

## 2018-02-10 RX ORDER — LANOLIN
CREAM (GRAM) TOPICAL
Status: DISCONTINUED | OUTPATIENT
Start: 2018-02-10 | End: 2018-02-12 | Stop reason: HOSPADM

## 2018-02-10 RX ORDER — DOCUSATE SODIUM 100 MG/1
100 CAPSULE, LIQUID FILLED ORAL 2 TIMES DAILY PRN
Status: DISCONTINUED | OUTPATIENT
Start: 2018-02-10 | End: 2018-02-12 | Stop reason: HOSPADM

## 2018-02-10 RX ORDER — CARBOPROST TROMETHAMINE 250 UG/ML
250 INJECTION, SOLUTION INTRAMUSCULAR AS NEEDED
Status: DISCONTINUED | OUTPATIENT
Start: 2018-02-10 | End: 2018-02-10 | Stop reason: HOSPADM

## 2018-02-10 RX ORDER — TRISODIUM CITRATE DIHYDRATE AND CITRIC ACID MONOHYDRATE 500; 334 MG/5ML; MG/5ML
30 SOLUTION ORAL ONCE
Status: DISCONTINUED | OUTPATIENT
Start: 2018-02-10 | End: 2018-02-10

## 2018-02-10 RX ORDER — EPHEDRINE SULFATE 50 MG/ML
5 INJECTION, SOLUTION INTRAVENOUS
Status: DISCONTINUED | OUTPATIENT
Start: 2018-02-10 | End: 2018-02-10

## 2018-02-10 RX ORDER — LANOLIN
CREAM (GRAM) TOPICAL
Status: CANCELLED | OUTPATIENT
Start: 2018-02-10

## 2018-02-10 RX ORDER — OXYCODONE HYDROCHLORIDE AND ACETAMINOPHEN 5; 325 MG/1; MG/1
1 TABLET ORAL EVERY 4 HOURS PRN
Status: DISCONTINUED | OUTPATIENT
Start: 2018-02-10 | End: 2018-02-10 | Stop reason: HOSPADM

## 2018-02-10 RX ORDER — PROMETHAZINE HYDROCHLORIDE 12.5 MG/1
12.5 TABLET ORAL EVERY 6 HOURS PRN
Status: DISCONTINUED | OUTPATIENT
Start: 2018-02-10 | End: 2018-02-10 | Stop reason: HOSPADM

## 2018-02-10 RX ORDER — ONDANSETRON 4 MG/1
4 TABLET, ORALLY DISINTEGRATING ORAL EVERY 6 HOURS PRN
Status: DISCONTINUED | OUTPATIENT
Start: 2018-02-10 | End: 2018-02-10 | Stop reason: HOSPADM

## 2018-02-10 RX ORDER — PROMETHAZINE HYDROCHLORIDE 12.5 MG/1
12.5 SUPPOSITORY RECTAL EVERY 6 HOURS PRN
Status: DISCONTINUED | OUTPATIENT
Start: 2018-02-10 | End: 2018-02-10

## 2018-02-10 RX ORDER — OXYCODONE HYDROCHLORIDE AND ACETAMINOPHEN 5; 325 MG/1; MG/1
1 TABLET ORAL EVERY 4 HOURS PRN
Status: DISCONTINUED | OUTPATIENT
Start: 2018-02-11 | End: 2018-02-12 | Stop reason: HOSPADM

## 2018-02-10 RX ORDER — LIDOCAINE HYDROCHLORIDE 10 MG/ML
5 INJECTION, SOLUTION EPIDURAL; INFILTRATION; INTRACAUDAL; PERINEURAL AS NEEDED
Status: DISCONTINUED | OUTPATIENT
Start: 2018-02-10 | End: 2018-02-10

## 2018-02-10 RX ORDER — PROMETHAZINE HYDROCHLORIDE 12.5 MG/1
12.5 TABLET ORAL EVERY 6 HOURS PRN
Status: DISCONTINUED | OUTPATIENT
Start: 2018-02-10 | End: 2018-02-10

## 2018-02-10 RX ORDER — ONDANSETRON 4 MG/1
4 TABLET, ORALLY DISINTEGRATING ORAL EVERY 6 HOURS PRN
Status: DISCONTINUED | OUTPATIENT
Start: 2018-02-10 | End: 2018-02-10

## 2018-02-10 RX ORDER — METHYLERGONOVINE MALEATE 0.2 MG/ML
200 INJECTION INTRAVENOUS ONCE AS NEEDED
Status: DISCONTINUED | OUTPATIENT
Start: 2018-02-10 | End: 2018-02-10 | Stop reason: HOSPADM

## 2018-02-10 RX ORDER — OXYCODONE HYDROCHLORIDE AND ACETAMINOPHEN 5; 325 MG/1; MG/1
2 TABLET ORAL EVERY 4 HOURS PRN
Status: CANCELLED | OUTPATIENT
Start: 2018-02-11

## 2018-02-10 RX ORDER — OXYCODONE HYDROCHLORIDE AND ACETAMINOPHEN 5; 325 MG/1; MG/1
2 TABLET ORAL EVERY 4 HOURS PRN
Status: DISCONTINUED | OUTPATIENT
Start: 2018-02-10 | End: 2018-02-10 | Stop reason: HOSPADM

## 2018-02-10 RX ORDER — PROMETHAZINE HYDROCHLORIDE 25 MG/ML
12.5 INJECTION, SOLUTION INTRAMUSCULAR; INTRAVENOUS EVERY 6 HOURS PRN
Status: DISCONTINUED | OUTPATIENT
Start: 2018-02-10 | End: 2018-02-10 | Stop reason: HOSPADM

## 2018-02-10 RX ORDER — MORPHINE SULFATE 2 MG/ML
2 INJECTION, SOLUTION INTRAMUSCULAR; INTRAVENOUS
Status: DISCONTINUED | OUTPATIENT
Start: 2018-02-10 | End: 2018-02-10 | Stop reason: HOSPADM

## 2018-02-10 RX ORDER — ONDANSETRON 4 MG/1
4 TABLET, FILM COATED ORAL EVERY 6 HOURS PRN
Status: DISCONTINUED | OUTPATIENT
Start: 2018-02-10 | End: 2018-02-10 | Stop reason: HOSPADM

## 2018-02-10 RX ORDER — SODIUM CHLORIDE, SODIUM LACTATE, POTASSIUM CHLORIDE, CALCIUM CHLORIDE 600; 310; 30; 20 MG/100ML; MG/100ML; MG/100ML; MG/100ML
125 INJECTION, SOLUTION INTRAVENOUS CONTINUOUS
Status: DISCONTINUED | OUTPATIENT
Start: 2018-02-10 | End: 2018-02-10

## 2018-02-10 RX ORDER — PROMETHAZINE HYDROCHLORIDE 25 MG/ML
12.5 INJECTION, SOLUTION INTRAMUSCULAR; INTRAVENOUS EVERY 6 HOURS PRN
Status: DISCONTINUED | OUTPATIENT
Start: 2018-02-10 | End: 2018-02-10

## 2018-02-10 RX ORDER — ZOLPIDEM TARTRATE 5 MG/1
5 TABLET ORAL NIGHTLY PRN
Status: DISCONTINUED | OUTPATIENT
Start: 2018-02-10 | End: 2018-02-12 | Stop reason: HOSPADM

## 2018-02-10 RX ORDER — MISOPROSTOL 200 UG/1
800 TABLET ORAL AS NEEDED
Status: DISCONTINUED | OUTPATIENT
Start: 2018-02-10 | End: 2018-02-10 | Stop reason: HOSPADM

## 2018-02-10 RX ORDER — ONDANSETRON 4 MG/1
4 TABLET, FILM COATED ORAL EVERY 6 HOURS PRN
Status: DISCONTINUED | OUTPATIENT
Start: 2018-02-10 | End: 2018-02-10

## 2018-02-10 RX ORDER — OXYCODONE HYDROCHLORIDE AND ACETAMINOPHEN 5; 325 MG/1; MG/1
2 TABLET ORAL EVERY 4 HOURS PRN
Status: DISCONTINUED | OUTPATIENT
Start: 2018-02-11 | End: 2018-02-12 | Stop reason: HOSPADM

## 2018-02-10 RX ORDER — IBUPROFEN 600 MG/1
600 TABLET ORAL EVERY 6 HOURS PRN
Status: CANCELLED | OUTPATIENT
Start: 2018-02-10

## 2018-02-10 RX ORDER — SODIUM CHLORIDE 0.9 % (FLUSH) 0.9 %
1-10 SYRINGE (ML) INJECTION AS NEEDED
Status: DISCONTINUED | OUTPATIENT
Start: 2018-02-10 | End: 2018-02-10

## 2018-02-10 RX ORDER — DOCUSATE SODIUM 100 MG/1
100 CAPSULE, LIQUID FILLED ORAL 2 TIMES DAILY PRN
Status: CANCELLED | OUTPATIENT
Start: 2018-02-10

## 2018-02-10 RX ORDER — ONDANSETRON 2 MG/ML
4 INJECTION INTRAMUSCULAR; INTRAVENOUS EVERY 6 HOURS PRN
Status: DISCONTINUED | OUTPATIENT
Start: 2018-02-10 | End: 2018-02-10 | Stop reason: HOSPADM

## 2018-02-10 RX ADMIN — ROPIVACAINE HYDROCHLORIDE 12 ML/HR: 2 INJECTION, SOLUTION EPIDURAL; INFILTRATION at 17:30

## 2018-02-10 RX ADMIN — SODIUM CHLORIDE, POTASSIUM CHLORIDE, SODIUM LACTATE AND CALCIUM CHLORIDE 125 ML/HR: 600; 310; 30; 20 INJECTION, SOLUTION INTRAVENOUS at 17:11

## 2018-02-10 RX ADMIN — SODIUM CHLORIDE, POTASSIUM CHLORIDE, SODIUM LACTATE AND CALCIUM CHLORIDE 125 ML/HR: 600; 310; 30; 20 INJECTION, SOLUTION INTRAVENOUS at 15:45

## 2018-02-10 RX ADMIN — Medication 333 MILLI-UNITS/MIN: at 20:23

## 2018-02-10 NOTE — PROGRESS NOTES
Robbie James  : 1995  MRN: [id number]  CSN: [id number]    Labor progress note    Subjective   She reports no problems and comfortable with epidural     Objective   Min/max vitals past 24 hours:  Temp  Min: 98.1 °F (36.7 °C)  Max: 98.1 °F (36.7 °C)   No Data Recorded   No Data Recorded   Resp  Min: 18  Max: 18        FHT's: reassuring and category 1   Cervix: was checked (by me): 6 cm / 90 % / 0   Contractions: regular        Assessment   1. IUP at 37w3d  2. Progressing in labor  3. Fetal status reassuring     Plan   1. AROM - clear fluid seen    Rodolfo Andrews MD  2/10/2018  6:32 PM

## 2018-02-10 NOTE — ANESTHESIA PREPROCEDURE EVALUATION
Anesthesia Evaluation     Patient summary reviewed and Nursing notes reviewed   NPO Solid Status: > 8 hours  NPO Liquid Status: > 8 hours           Airway   Mallampati: I  TM distance: >3 FB  Neck ROM: full  no difficulty expected  Dental - normal exam   (+) poor dentition        Pulmonary    (+) a smoker Current, decreased breath sounds,   Cardiovascular - normal exam        Neuro/Psych  GI/Hepatic/Renal/Endo    (+)  GERD,     Musculoskeletal     Abdominal  - normal exam   Substance History   (+) drug use ( ? hx )     OB/GYN    (+) Pregnant,         Other        ROS/Med Hx Other: plt 113  Hx of medical non compliance                Anesthesia Plan    ASA 3 - emergent     epidural   (Risks discussed , including but not limited to:  Headache, itching, n&v, infection, failure, decreased blood pressure, permanent chronic/back pain, nerve damage, paralysis, etc. All questions answered and informed consent obtained.)  intravenous induction   Anesthetic plan and risks discussed with patient.

## 2018-02-10 NOTE — ANESTHESIA PROCEDURE NOTES
Labor Epidural    Patient location during procedure: OB  Indication:at surgeon's request  Performed By  CRNA: TG PETER  Preanesthetic Checklist  Completed: patient identified, site marked, surgical consent, pre-op evaluation, timeout performed, IV checked, risks and benefits discussed and monitors and equipment checked  Additional Notes  Epidural placed via sterile technique, with neg test dose after neg aspiration test, see rn note for time, with 3 ml lidocaine 1.5% with epi. Noted adequate analgesia noted, place on epid pump no port locked and warning labels placed   Prep:  Pt Position:sitting  Sterile Tech:cap, gloves, gown, mask and sterile barrier  Prep:chlorhexidine gluconate and isopropyl alcohol and air dry 3 min  Monitoring:blood pressure monitoring, continuous pulse oximetry and EKG  Epidural Block Procedure:  Approach:midline  Guidance:landmark technique and palpation technique  Location:L3-L4  Needle Type:Tuohy  Needle Gauge:18 G  Loss of Resistance: 6cm  Cath Depth at skin:9 cm  Paresthesia: none  Aspiration:negative  Test Dose:negative  Number of Attempts: 1  Post Assessment:  Dressing:occlusive dressing applied, secured with tape and biopatch applied  Pt Tolerance:patient tolerated the procedure well with no apparent complications  Complications:no

## 2018-02-10 NOTE — H&P
"Psychiatric  Alyson James  : 1995  MRN: [id number]  CSN: [id number]    History and Physical    Subjective   Alyson James is a 22 y.o. year old  with an Estimated Date of Delivery: 18 currently at 37w3d presenting with regular contractions occuring every 5-6 minutes.  She also reports normal fetal movement, no leaking and no vaginal bleeding.    Prenatal care has been with Dr. Wan and Dr. Cho.  It has been complicated by tobacco use and gestational thrombocytopenia.    Obstetric History       T1      L1     SAB0   TAB0   Ectopic0   Multiple0   Live Births1       # Outcome Date GA Lbr Jose/2nd Weight Sex Delivery Anes PTL Lv   2 Current            1 Term 12 39w0d  3742 g (8 lb 4 oz) M Vag-Spont EPI  MELINDA      Complications: Kidney infection      Obstetric Comments    - Fady     Past Medical History:   Diagnosis Date   • Gout      Past Surgical History:   Procedure Laterality Date   • WISDOM TOOTH EXTRACTION         No Known Allergies  Smoking status: Current Every Day Smoker                                                   Packs/day: 0.25      Years: 0.00         Types: Cigarettes     Start date:   Smokeless status: Never Used                      Comment: Smokes about 3 cigarettes per day    Review of Systems      Objective   Temp 98.1 °F (36.7 °C) (Oral)   Resp 18  Ht 160 cm (62.99\")  Wt 67.6 kg (149 lb)  LMP 2017  BMI 26.4 kg/m2  General: well developed; well nourished  no acute distress   Heart: Not performed.   Lungs: breathing is unlabored   Abdomen: soft, non-tender; no masses  no umbilical or inginual hernias are present  no hepato-splenomegaly   FHT's: reassuring and category 2   Cervix: was checked (by RN): 3 cm / 70 % / -1 with BBOW   Contractions: regular     Prenatal Labs  Lab Results   Component Value Date    HGB 11.6 (L) 2018    RUBELLAABIGG 3.09 2017    HEPBSAG Negative 2017    ABSCRN Negative 2017 "    [id number] Non Reactive 07/19/2017     11/27/2017    STREPGPB Negative 01/25/2018    URINECX Final report 01/31/2018       Current Labs Reviewed   No data reviewed       Assessment   1. IUP at 37w3d  2. Group B strep status: negative     Plan   1. Expectant management  2. OK for epidural   3. U/S to confirm cephalic presentation    Rodolfo Andrews MD  2/10/2018  3:42 PM

## 2018-02-11 ENCOUNTER — LAB REQUISITION (OUTPATIENT)
Dept: LAB | Facility: HOSPITAL | Age: 23
End: 2018-02-11

## 2018-02-11 DIAGNOSIS — O48.0 POST TERM PREGNANCY: ICD-10-CM

## 2018-02-11 LAB
HCT VFR BLD AUTO: 33.6 % (ref 37–47)
HGB BLD-MCNC: 11.3 G/DL (ref 12–16)

## 2018-02-11 PROCEDURE — 85018 HEMOGLOBIN: CPT | Performed by: OBSTETRICS & GYNECOLOGY

## 2018-02-11 PROCEDURE — 99232 SBSQ HOSP IP/OBS MODERATE 35: CPT | Performed by: OBSTETRICS & GYNECOLOGY

## 2018-02-11 PROCEDURE — 85014 HEMATOCRIT: CPT | Performed by: OBSTETRICS & GYNECOLOGY

## 2018-02-11 RX ADMIN — BENZOCAINE AND MENTHOL 1 APPLICATION: 20; .5 SPRAY TOPICAL at 00:33

## 2018-02-11 RX ADMIN — Medication 1 APPLICATION: at 00:32

## 2018-02-11 RX ADMIN — IBUPROFEN 600 MG: 600 TABLET ORAL at 05:21

## 2018-02-11 RX ADMIN — DOCUSATE SODIUM 100 MG: 100 CAPSULE, LIQUID FILLED ORAL at 21:21

## 2018-02-11 RX ADMIN — OXYCODONE AND ACETAMINOPHEN 2 TABLET: 5; 325 TABLET ORAL at 05:21

## 2018-02-11 RX ADMIN — IBUPROFEN 600 MG: 600 TABLET ORAL at 12:47

## 2018-02-11 RX ADMIN — IBUPROFEN 600 MG: 600 TABLET ORAL at 19:16

## 2018-02-11 RX ADMIN — OXYCODONE AND ACETAMINOPHEN 2 TABLET: 5; 325 TABLET ORAL at 12:48

## 2018-02-11 RX ADMIN — OXYCODONE AND ACETAMINOPHEN 2 TABLET: 5; 325 TABLET ORAL at 19:17

## 2018-02-11 NOTE — PLAN OF CARE
Problem: Labor (Cervical Ripen, Induct, Augment) (Adult,Obstetrics,Pediatric)  Goal: Signs and Symptoms of Listed Potential Problems Will be Absent or Manageable (Labor)  Outcome: Outcome(s) achieved Date Met: 02/10/18

## 2018-02-11 NOTE — PROGRESS NOTES
Robbie James  : 1995  MRN: [id number]  CSN: [id number]    Postpartum Day #1  Subjective   Her pain is well controlled.  Vaginal bleeding is less than a normal period.       Objective     Min/max vitals past 24 hours:   Temp  Min: 98.1 °F (36.7 °C)  Max: 98.4 °F (36.9 °C)  BP  Min: 67/12  Max: 129/76  Pulse  Min: 86  Max: 119  Resp  Min: 18  Max: 18        Abdomen: soft, non-tender; bowel sounds normal; no masses   fundus firm and non-tender   Pelvic: deferred       Results from last 7 days  Lab Units 18  0604 02/10/18  1601 18  1148   WBC 10*3/mm3  --  12.55* 8.35   HEMOGLOBIN g/dL 11.3* 12.1 11.6*   HEMATOCRIT % 33.6* 35.6* 34.8*   PLATELETS 10*3/mm3  --  113* 103*     Lab Results   Component Value Date    RH Negative 02/10/2018    HEPBSAG Negative 2017        Assessment   1. Postpartum Day #1 S/P vaginal delivery  2. Unconfirmed (+) UDS for narcotics     Plan   1. Continue routine postpartum care  2. Social service consult has been placed  3. Confirmation test for narcotics ordered    Rodolfo Andrews MD  2018  8:48 AM

## 2018-02-11 NOTE — PLAN OF CARE
Problem: Patient Care Overview (Adult)  Goal: Plan of Care Review  Outcome: Ongoing (interventions implemented as appropriate)   02/11/18 0471   Coping/Psychosocial Response Interventions   Plan Of Care Reviewed With patient   Patient Care Overview   Progress improving   Outcome Evaluation   Outcome Summary/Follow up Plan Social Service will be here to see Mom.     Goal: Adult Individualization and Mutuality  Outcome: Ongoing (interventions implemented as appropriate)    Goal: Discharge Needs Assessment  Outcome: Ongoing (interventions implemented as appropriate)      Problem: Postpartum, Vaginal Delivery (Adult)  Goal: Signs and Symptoms of Listed Potential Problems Will be Absent or Manageable (Postpartum, Vaginal Delivery)  Outcome: Ongoing (interventions implemented as appropriate)

## 2018-02-11 NOTE — PLAN OF CARE
Problem: Patient Care Overview (Adult)  Goal: Plan of Care Review  Outcome: Ongoing (interventions implemented as appropriate)   02/11/18 0655   Coping/Psychosocial Response Interventions   Plan Of Care Reviewed With patient   Patient Care Overview   Progress progress toward functional goals as expected     Goal: Adult Individualization and Mutuality  Outcome: Ongoing (interventions implemented as appropriate)   02/11/18 0655   Individualization   Patient Specific Preferences wants to breast and bottle feed   Patient Specific Goals tolerating feedings at discharge.   Patient Specific Interventions feed every 2 to 4 hours depending on feeding type     Goal: Discharge Needs Assessment  Outcome: Ongoing (interventions implemented as appropriate)   02/11/18 0655   Discharge Needs Assessment   Concerns To Be Addressed substance/tobacco abuse/use concerns   Readmission Within The Last 30 Days no previous admission in last 30 days   Equipment Needed After Discharge none   Discharge Disposition home or self-care   Current Health   Anticipated Changes Related to Illness none   Self-Care   Equipment Currently Used at Home none   Living Environment   Transportation Available car;family or friend will provide       Problem: Postpartum, Vaginal Delivery (Adult)  Goal: Signs and Symptoms of Listed Potential Problems Will be Absent or Manageable (Postpartum, Vaginal Delivery)  Outcome: Ongoing (interventions implemented as appropriate)   02/11/18 0655   Postpartum, Vaginal Delivery   Problems Assessed (Postpartum Vaginal Delivery) all   Problems Present (Postpartum Vaginal Delivery) none

## 2018-02-11 NOTE — L&D DELIVERY NOTE
Robbie  Vaginal Delivery Note    Delivery details     Delivery: Vaginal, Vacuum (Extractor)     YOB: 2019     Time of Birth: 20:20     Anesthesia: Epidural   Delivering clinician: Rodolfo Andrews    Forceps?   No   Vacuum? yes    Shoulder dystocia present: No      Delivery narrative:  Due to maternal exhaustion and poor expulsive efforts, the vacuum applied with infant in occiput anterior, +3 station.  On first pushing effort head delivered without difficulty.  Vacuum removed and then she completed the delivery.  Mouth and nose bulb suctioned upon perineum and again after delivery.  Delayed cord clamping for 30 seconds.  Cord and clamped and  placed on maternal abdomen.  Placenta delivered spontaneously and intact with normal three-vessel cord.  Spontaneous midline second-degree perineal laceration repaired with 3-0 chromic.    Infant details    Findings: Male infant     Infant observations: Weight: No birth weight on file.   Length:    in   Apgars: 8   @ 1 minute /    9   @ 5 minutes     Placenta, Cord, and Fluid    Placenta delivered  Spontaneous  at         Cord: 3 vessels  present.   Nuchal Cord?  no   Cord blood obtained: Yes      Repair    Episiotomy: None    Lacerations: Second degree midline   Estimated Blood Loss: 200   Suture used for repair: 3-0 chromic gut       Complications  none    Disposition  Mother to Mother Baby/Postpartum  in stable condition currently.  Baby to NBN  in stable condition currently.      Rodolfo Andrews MD  02/10/18  9:14 PM

## 2018-02-12 VITALS
SYSTOLIC BLOOD PRESSURE: 112 MMHG | WEIGHT: 149 LBS | TEMPERATURE: 97.7 F | HEIGHT: 63 IN | RESPIRATION RATE: 16 BRPM | BODY MASS INDEX: 26.4 KG/M2 | HEART RATE: 83 BPM | DIASTOLIC BLOOD PRESSURE: 50 MMHG

## 2018-02-12 LAB — BACTERIA SPEC AEROBE CULT: NO GROWTH

## 2018-02-12 PROCEDURE — 99238 HOSP IP/OBS DSCHRG MGMT 30/<: CPT | Performed by: PHYSICIAN ASSISTANT

## 2018-02-12 PROCEDURE — G0008 ADMIN INFLUENZA VIRUS VAC: HCPCS | Performed by: MIDWIFE

## 2018-02-12 PROCEDURE — 90661 CCIIV3 VAC ABX FR 0.5 ML IM: CPT | Performed by: MIDWIFE

## 2018-02-12 PROCEDURE — 90715 TDAP VACCINE 7 YRS/> IM: CPT | Performed by: OBSTETRICS & GYNECOLOGY

## 2018-02-12 PROCEDURE — 90471 IMMUNIZATION ADMIN: CPT | Performed by: OBSTETRICS & GYNECOLOGY

## 2018-02-12 PROCEDURE — 25010000002 INFLUENZA VAC SUBUNIT QUAD 0.5 ML SUSPENSION PREFILLED SYRINGE: Performed by: MIDWIFE

## 2018-02-12 PROCEDURE — 25010000002 TDAP 5-2.5-18.5 LF-MCG/0.5 SUSPENSION: Performed by: OBSTETRICS & GYNECOLOGY

## 2018-02-12 RX ORDER — PSEUDOEPHEDRINE HCL 30 MG
100 TABLET ORAL 2 TIMES DAILY PRN
Qty: 30 CAPSULE | Refills: 0 | Status: SHIPPED | OUTPATIENT
Start: 2018-02-12 | End: 2020-01-15 | Stop reason: SDUPTHER

## 2018-02-12 RX ORDER — HYDROCODONE BITARTRATE AND ACETAMINOPHEN 5; 325 MG/1; MG/1
1 TABLET ORAL EVERY 6 HOURS PRN
Qty: 30 TABLET | Refills: 0 | Status: SHIPPED | OUTPATIENT
Start: 2018-02-12 | End: 2020-05-08 | Stop reason: HOSPADM

## 2018-02-12 RX ORDER — IBUPROFEN 600 MG/1
600 TABLET ORAL EVERY 6 HOURS PRN
Qty: 30 TABLET | Refills: 0 | Status: SHIPPED | OUTPATIENT
Start: 2018-02-12 | End: 2020-05-08 | Stop reason: HOSPADM

## 2018-02-12 RX ADMIN — A/SINGAPORE/GP1908/2015 IVR-180 (H1N1) (AN A/MICHIGAN/45/2015-LIKE VIRUS), A/SINGAPORE/GP2050/2015 (H3N2) (AN A/HONG KONG/4801/2014 - LIKE VIRUS), B/UTAH/9/2014 (A B/PHUKET/3073/2013-LIKE VIRUS), B/HONG KONG/259/2010 (A B/BRISBANE/60/08-LIKE VIRUS) 0.5 ML: 15; 15; 15; 15 INJECTION, SUSPENSION INTRAMUSCULAR at 15:10

## 2018-02-12 RX ADMIN — TETANUS TOXOID, REDUCED DIPHTHERIA TOXOID AND ACELLULAR PERTUSSIS VACCINE, ADSORBED 0.5 ML: 5; 2.5; 8; 8; 2.5 SUSPENSION INTRAMUSCULAR at 15:10

## 2018-02-12 RX ADMIN — OXYCODONE AND ACETAMINOPHEN 2 TABLET: 5; 325 TABLET ORAL at 04:18

## 2018-02-12 RX ADMIN — OXYCODONE AND ACETAMINOPHEN 2 TABLET: 5; 325 TABLET ORAL at 00:10

## 2018-02-12 RX ADMIN — IBUPROFEN 600 MG: 600 TABLET ORAL at 06:19

## 2018-02-12 RX ADMIN — OXYCODONE AND ACETAMINOPHEN 2 TABLET: 5; 325 TABLET ORAL at 15:20

## 2018-02-12 RX ADMIN — IBUPROFEN 600 MG: 600 TABLET ORAL at 15:20

## 2018-02-12 NOTE — DISCHARGE SUMMARY
Discharge Summary     Robbie James  : 1995  MRN: [id number]  CSN: [id number]    Date of Admission: 2/10/2018   Date of Discharge:  2018   Delivering Physician: Rodolfo Andrews        Admission Diagnosis: 1. 37 weeks gestation of pregnancy [Z3A.37]  2. 37 weeks gestation of pregnancy [Z3A.37]  3. Postpartum care following vaginal delivery [Z39.2]   Discharge Diagnosis: 1. Same as above plus  2. Pregnancy at 37w3d - delivered       Procedures: 2/10/2018  - Vaginal, Vacuum (Extractor)       Hospital Course  Patient is a 22 y.o.  who at 37w3d had a vaginal birth.  Her postpartum course was without complications.  On PPD #2 she was ready for discharge.  She had normal lochia and pain well controlled with oral medications.    Infant  male  fetus weighing No birth weight on file.   Apgars -  8  @ 1 minute /  9  @ 5 minutes.    Discharge labs  Lab Results   Component Value Date    WBC 12.55 (H) 02/10/2018    HGB 11.3 (L) 2018    HCT 33.6 (L) 2018     (L) 02/10/2018       Discharge Medications   Alyson James   Home Medication Instructions MARQUITA:[id number]    Printed on:18 0841   Medication Information                      docusate sodium 100 MG capsule  Take 100 mg by mouth 2 (Two) Times a Day As Needed for Constipation.             HYDROcodone-acetaminophen (NORCO) 5-325 MG per tablet  Take 1 tablet by mouth Every 6 (Six) Hours As Needed for Moderate Pain  (prn pain).             ibuprofen (ADVIL,MOTRIN) 600 MG tablet  Take 1 tablet by mouth Every 6 (Six) Hours As Needed for Mild Pain .             measles, mumps and rubella vaccine (MMR) injection  Inject 0.5 mL under the skin 1 (One) Time for 1 dose.             Prenatal Vit-Fe Fumarate-FA (PRENATAL, CLASSIC, VITAMIN) 28-0.8 MG tablet tablet  Take  by mouth Daily.             ranitidine (ZANTAC) 150 MG capsule  Take 1 capsule by mouth 2 (Two) Times a Day.             Tdap (BOOSTRIX) 5-2.5-18.5 LF-MCG/0.5  injection  Inject 0.5 mL into the shoulder, thigh, or buttocks 1 (One) Time for 1 dose. Indications: Diphtheria, Whooping Cough, Tetanus                 Discharge Disposition Home or Self Care   Condition on Discharge: good   Follow-up: 6 weeks with DANDRE Campo PA-C  2/12/2018

## 2018-02-12 NOTE — PLAN OF CARE
Problem: Patient Care Overview (Adult)  Goal: Plan of Care Review  Outcome: Outcome(s) achieved Date Met: 02/12/18 02/12/18 1339   Coping/Psychosocial Response Interventions   Plan Of Care Reviewed With patient   Patient Care Overview   Progress improving   Outcome Evaluation   Outcome Summary/Follow up Plan VSS, routine pp care.     Goal: Adult Individualization and Mutuality  Outcome: Outcome(s) achieved Date Met: 02/12/18    Goal: Discharge Needs Assessment  Outcome: Outcome(s) achieved Date Met: 02/12/18      Problem: Postpartum, Vaginal Delivery (Adult)  Goal: Signs and Symptoms of Listed Potential Problems Will be Absent or Manageable (Postpartum, Vaginal Delivery)  Outcome: Outcome(s) achieved Date Met: 02/12/18

## 2018-02-12 NOTE — PLAN OF CARE
Problem: Patient Care Overview (Adult)  Goal: Plan of Care Review  Outcome: Ongoing (interventions implemented as appropriate)   02/12/18 0623   Coping/Psychosocial Response Interventions   Plan Of Care Reviewed With patient;significant other   Patient Care Overview   Progress improving   Outcome Evaluation   Outcome Summary/Follow up Plan concerned regarding 72 hr hold on baby; SS implications and importance of f/u; VSS; pain controlled with pain meds     Goal: Adult Individualization and Mutuality  Outcome: Ongoing (interventions implemented as appropriate)    Goal: Discharge Needs Assessment  Outcome: Ongoing (interventions implemented as appropriate)      Problem: Postpartum, Vaginal Delivery (Adult)  Goal: Signs and Symptoms of Listed Potential Problems Will be Absent or Manageable (Postpartum, Vaginal Delivery)  Outcome: Ongoing (interventions implemented as appropriate)

## 2018-02-12 NOTE — PROGRESS NOTES
"Patient: Alyson James  * No surgery found *  Anesthesia type: [unfilled]    Patient location: Labor and Delivery  Last vitals: /50 (BP Location: Right arm, Patient Position: Lying)  Pulse 83  Temp 97.7 °F (36.5 °C) (Oral)   Resp 16  Ht 160 cm (62.99\")  Wt 67.6 kg (149 lb)  LMP 04/23/2017  Breastfeeding? Yes  BMI 26.4 kg/m2  Level of consciousness: awake, alert and oriented    Post-anesthesia pain: adequate analgesia  Airway patency: patent  Respiratory: unassisted  Cardiovascular: stable and blood pressure at baseline  Hydration: euvolemic    Anesthetic complications: no  "

## 2018-02-14 LAB
ABO + RH BLD: NORMAL
ABO + RH BLD: NORMAL
BH BB BLOOD EXPIRATION DATE: NORMAL
BH BB BLOOD EXPIRATION DATE: NORMAL
BH BB BLOOD TYPE BARCODE: 1700
BH BB BLOOD TYPE BARCODE: 1700
BH BB DISPENSE STATUS: NORMAL
BH BB DISPENSE STATUS: NORMAL
BH BB PRODUCT CODE: NORMAL
BH BB PRODUCT CODE: NORMAL
BH BB UNIT NUMBER: NORMAL
BH BB UNIT NUMBER: NORMAL
CROSSMATCH INTERPRETATION: NORMAL
CROSSMATCH INTERPRETATION: NORMAL
UNIT  ABO: NORMAL
UNIT  ABO: NORMAL
UNIT  RH: NORMAL
UNIT  RH: NORMAL

## 2018-02-20 LAB — OPIATES UR QL SCN: NEGATIVE NG/ML

## 2018-02-23 ENCOUNTER — TELEPHONE (OUTPATIENT)
Dept: OBSTETRICS AND GYNECOLOGY | Facility: CLINIC | Age: 23
End: 2018-02-23

## 2018-02-23 NOTE — TELEPHONE ENCOUNTER
DR SHANNON PATIENT    PLEASE CALL [facility] IN [facility]  [phone]    ATTN:  DR SELVIN OPON    PATIENT WAS AT [facility] ON 18;  BABY AND MOM TESTED POSITIVE FOR OPIOIDS ON URINE DRUG SCREEN    BABY BOY  2/10/18    NEEDS TO KNOW IF HISTORY  OF DRUG ABUSE OR IF OPIOIDS GIVEN FOR DELIVERY    PLEASE CALL ABOVE NUMBER/[facility]

## 2018-02-23 NOTE — TELEPHONE ENCOUNTER
This is a patient of Dr. Etienne's practice.  I delivered her while working in [place].  Information is being forward to them for follow-up.  It is not something that we need to take care of here at our practice in [place]

## 2018-03-08 ENCOUNTER — RESULTS ENCOUNTER (OUTPATIENT)
Dept: OBSTETRICS AND GYNECOLOGY | Facility: CLINIC | Age: 23
End: 2018-03-08

## 2018-03-08 DIAGNOSIS — Z34.83 ENCOUNTER FOR SUPERVISION OF OTHER NORMAL PREGNANCY IN THIRD TRIMESTER: ICD-10-CM

## 2019-12-17 ENCOUNTER — INITIAL PRENATAL (OUTPATIENT)
Dept: OBSTETRICS AND GYNECOLOGY | Facility: CLINIC | Age: 24
End: 2019-12-17

## 2019-12-17 VITALS — BODY MASS INDEX: 25.51 KG/M2 | DIASTOLIC BLOOD PRESSURE: 64 MMHG | SYSTOLIC BLOOD PRESSURE: 118 MMHG | WEIGHT: 144 LBS

## 2019-12-17 DIAGNOSIS — Z34.82 ENCOUNTER FOR SUPERVISION OF OTHER NORMAL PREGNANCY IN SECOND TRIMESTER: Primary | ICD-10-CM

## 2019-12-17 PROBLEM — R82.5 POSITIVE URINE DRUG SCREEN: Status: RESOLVED | Noted: 2018-02-10 | Resolved: 2019-12-17

## 2019-12-17 PROBLEM — O09.30 LATE PRENATAL CARE: Status: ACTIVE | Noted: 2019-12-17

## 2019-12-17 LAB
C TRACH RRNA SPEC DONR QL NAA+PROBE: NEGATIVE
N GONORRHOEA DNA SPEC QL NAA+PROBE: NEGATIVE

## 2019-12-17 PROCEDURE — 99214 OFFICE O/P EST MOD 30 MIN: CPT | Performed by: MIDWIFE

## 2019-12-18 LAB
ABO GROUP BLD: ABNORMAL
AMPHETAMINES UR QL SCN: NEGATIVE NG/ML
BACTERIA UR CULT: NO GROWTH
BACTERIA UR CULT: NORMAL
BARBITURATES UR QL SCN: NEGATIVE NG/ML
BASOPHILS # BLD AUTO: 0 X10E3/UL (ref 0–0.2)
BASOPHILS NFR BLD AUTO: 0 %
BENZODIAZ UR QL SCN: NEGATIVE NG/ML
BLD GP AB SCN SERPL QL: NEGATIVE
BZE UR QL SCN: NEGATIVE NG/ML
CANNABINOIDS UR QL SCN: NEGATIVE NG/ML
CREAT UR-MCNC: 36.4 MG/DL (ref 20–300)
EOSINOPHIL # BLD AUTO: 0.2 X10E3/UL (ref 0–0.4)
EOSINOPHIL NFR BLD AUTO: 2 %
ERYTHROCYTE [DISTWIDTH] IN BLOOD BY AUTOMATED COUNT: 14.2 % (ref 12.3–15.4)
FENTANYL+NORFENTANYL UR QL SCN: NEGATIVE PG/ML
HBV SURFACE AG SERPL QL IA: NEGATIVE
HCT VFR BLD AUTO: 39.7 % (ref 34–46.6)
HCV AB S/CO SERPL IA: <0.1 S/CO RATIO (ref 0–0.9)
HGB BLD-MCNC: 13.5 G/DL (ref 11.1–15.9)
HIV 1+2 AB+HIV1 P24 AG SERPL QL IA: NON REACTIVE
IMM GRANULOCYTES # BLD AUTO: 0 X10E3/UL (ref 0–0.1)
IMM GRANULOCYTES NFR BLD AUTO: 0 %
LABORATORY COMMENT REPORT: NORMAL
LYMPHOCYTES # BLD AUTO: 1.2 X10E3/UL (ref 0.7–3.1)
LYMPHOCYTES NFR BLD AUTO: 12 %
MCH RBC QN AUTO: 30.8 PG (ref 26.6–33)
MCHC RBC AUTO-ENTMCNC: 34 G/DL (ref 31.5–35.7)
MCV RBC AUTO: 90 FL (ref 79–97)
MEPERIDINE UR QL: NEGATIVE NG/ML
METHADONE UR QL SCN: NEGATIVE NG/ML
MONOCYTES # BLD AUTO: 0.5 X10E3/UL (ref 0.1–0.9)
MONOCYTES NFR BLD AUTO: 6 %
NEUTROPHILS # BLD AUTO: 7.8 X10E3/UL (ref 1.4–7)
NEUTROPHILS NFR BLD AUTO: 80 %
OPIATES UR QL SCN: NEGATIVE NG/ML
OXYCODONE+OXYMORPHONE UR QL SCN: NEGATIVE NG/ML
PCP UR QL: NEGATIVE NG/ML
PH UR: 7.4 [PH] (ref 4.5–8.9)
PLATELET # BLD AUTO: 141 X10E3/UL (ref 150–450)
PROPOXYPH UR QL SCN: NEGATIVE NG/ML
RBC # BLD AUTO: 4.39 X10E6/UL (ref 3.77–5.28)
RH BLD: NEGATIVE
RPR SER QL: NON REACTIVE
RUBV IGG SERPL IA-ACNC: 2.26 INDEX
SP GR UR: 1.01
TRAMADOL UR QL SCN: NEGATIVE NG/ML
TSH SERPL DL<=0.005 MIU/L-ACNC: 1.32 UIU/ML (ref 0.27–4.2)
WBC # BLD AUTO: 9.7 X10E3/UL (ref 3.4–10.8)

## 2019-12-18 RX ORDER — PROMETHAZINE HYDROCHLORIDE 25 MG/1
25 TABLET ORAL EVERY 6 HOURS PRN
Qty: 20 TABLET | Refills: 0 | Status: SHIPPED | OUTPATIENT
Start: 2019-12-18 | End: 2020-03-11 | Stop reason: SDUPTHER

## 2019-12-18 NOTE — PROGRESS NOTES
Subjective     Chief Complaint   Patient presents with   • Initial Prenatal Visit     LMP 19, pap due, c/o cramping and nausea        Alyson James is a 24 y.o. .  Patient's last menstrual period was 2019..  She presents to be seen to initiate prenatal care with our practice. She is unsure of her LMP it was either 8/5 or 8/15. No reason stated for not receiveing care sooner. She states she hasn't felt her baby move yet. She has been having some intermittent, mild lower abdominal cramping for several weeks. She denies bleeding or constipation. She is also having mild nausea throughout the day.     The following portions of the patient's history were reviewed and updated as appropriate:vital signs, allergies, current medications, past medical history, past social history, past surgical history and problem list.    Review of Systems -   /64   Wt 65.3 kg (144 lb)   LMP 2019   BMI 25.51 kg/m²    Gastrointestinal:  nausea and rare vomiting, denies constipation  Genitourinary: denies frequency, urgency, or burning with urination  All other systems were reviewed and are negative    Objective     Physical Exam  Constitutional   The patient is awake, alert, well developed, well nourished and well groomed.   Neck   The neck is supple and the trachea is midline. The thyroid is not enlarged and there are no palpable nodules.   Breast   Inspection of the breast reveals symmetrical.  The nipples are everted and without discharge. No masses  Respiratory  The patient is relaxed and breathes without effort.   Lungs CTAB  Cardiovascular  Normal rate and rhythm is regular without murmur -  Negative LE pitting edema  Gastrointestinal   The abdomen is gravid - soft - non tender.  No umbilical hernia  Genitourinary   - External Genitalia without erythema, lesions, or masses  -Vagina - There is no abnormal vaginal discharge.   -Cervix is without cervical motion tenderness   Uterus - fundus @  U-1  Musculoskeletal  Normal gait, no joint pain or swelling  Extremities  Full ROM. No cyanosis or edema  Psychiatric  The patient is oriented to person, place, and time. Maintains eye contact     Imaging   Abdominal US/anatomy scan  19w4d, normal anatomy completely seen    Assessment/Plan     ASSESSMENT  IUP at 19w2d  Low risk pregnancy  Late entry to care    PLAN  1. Tests ordered today (option for genetic screening info given if appropriate):   Orders Placed This Encounter   Procedures   • Urine Culture - , Urine, Random Void   • OB Panel With HIV   • TSH   • Pain Management Profile (13 Drugs) Urine - Urine, Clean Catch   Declined quad screen    2. Medications prescribed today:  New Medications Ordered This Visit   Medications   • promethazine (PHENERGAN) 25 MG tablet     Sig: Take 1 tablet by mouth Every 6 (Six) Hours As Needed for Nausea or Vomiting.     Dispense:  20 tablet     Refill:  0     3. Information reviewed:smoking in pregnancy, exercise in pregnancy, nutrition in pregnancy, weight gain in pregnancy, work and travel restrictions during pregnancy, list of OTC medications acceptable in pregnancy and call coverage groups    Follow up: 4 week(s)         This note was electronically signed.    Yani Barriga CNM  December 18, 2019

## 2019-12-23 DIAGNOSIS — Z34.82 ENCOUNTER FOR SUPERVISION OF OTHER NORMAL PREGNANCY IN SECOND TRIMESTER: ICD-10-CM

## 2019-12-23 PROBLEM — O26.899 RH NEGATIVE STATUS DURING PREGNANCY: Status: ACTIVE | Noted: 2019-12-17

## 2019-12-23 PROBLEM — Z67.91 RH NEGATIVE STATUS DURING PREGNANCY: Status: ACTIVE | Noted: 2019-12-17

## 2020-01-15 ENCOUNTER — ROUTINE PRENATAL (OUTPATIENT)
Dept: OBSTETRICS AND GYNECOLOGY | Facility: CLINIC | Age: 25
End: 2020-01-15

## 2020-01-15 VITALS — BODY MASS INDEX: 26.76 KG/M2 | WEIGHT: 151 LBS | DIASTOLIC BLOOD PRESSURE: 68 MMHG | SYSTOLIC BLOOD PRESSURE: 118 MMHG

## 2020-01-15 DIAGNOSIS — K42.9 UMBILICAL HERNIA WITHOUT OBSTRUCTION AND WITHOUT GANGRENE: Primary | ICD-10-CM

## 2020-01-15 PROCEDURE — 99213 OFFICE O/P EST LOW 20 MIN: CPT | Performed by: OBSTETRICS & GYNECOLOGY

## 2020-01-15 RX ORDER — PSEUDOEPHEDRINE HCL 30 MG
100 TABLET ORAL 2 TIMES DAILY PRN
Qty: 60 EACH | Refills: 6 | Status: SHIPPED | OUTPATIENT
Start: 2020-01-15

## 2020-01-15 RX ORDER — SENNA AND DOCUSATE SODIUM 50; 8.6 MG/1; MG/1
2 TABLET, FILM COATED ORAL DAILY
Qty: 30 TABLET | Refills: 1 | Status: SHIPPED | OUTPATIENT
Start: 2020-01-15

## 2020-01-15 NOTE — PROGRESS NOTES
Chief Complaint   Patient presents with   • Routine Prenatal Visit     has a knot on stomach she wants looked at, states she is really bad constipoated, mild cramping        HPI:   , 23w2d gestation reports doing well    ROS:  See Prenatal Episode/Flowsheet  /68   Wt 68.5 kg (151 lb)   LMP 2019   BMI 26.76 kg/m²      EXAM:  EXTREMITIES:  No swelling-See Prenatal Episode/Flowsheet    ABDOMEN:  FHTs/Movement noted-See Prenatal Episode/Flowsheet    URINE GLUCOSE/PROTEIN:  See Prenatal Episode/Flowsheet    PELVIC EXAM:  See Prenatal Episode/Flowsheet  CV:  Lungs:  GYN:    MDM:    Lab Results   Component Value Date    HGB 13.5 2019    RUBELLAABIGG 2.26 2019    HEPBSAG Negative 2019    ABO B 2019    RH Negative 2019    ABSCRN Negative 2019    HLA6TCT9 Non Reactive 2019    HEPCVIRUSABY <0.1 2019    STREPGPB Negative 2018    URINECX Final report 2019       U/S:    1. IUP 23w2d  2. Routine care   3. Constipation:   4. Umbilival hernia: sttes it has been there for 9 years   GS referral

## 2020-01-29 ENCOUNTER — TELEPHONE (OUTPATIENT)
Dept: SURGERY | Facility: CLINIC | Age: 25
End: 2020-01-29

## 2020-02-04 ENCOUNTER — ROUTINE PRENATAL (OUTPATIENT)
Dept: OBSTETRICS AND GYNECOLOGY | Facility: CLINIC | Age: 25
End: 2020-02-04

## 2020-02-04 VITALS — WEIGHT: 158 LBS | DIASTOLIC BLOOD PRESSURE: 72 MMHG | SYSTOLIC BLOOD PRESSURE: 112 MMHG | BODY MASS INDEX: 28 KG/M2

## 2020-02-04 DIAGNOSIS — Z3A.26 26 WEEKS GESTATION OF PREGNANCY: ICD-10-CM

## 2020-02-04 DIAGNOSIS — Z34.92 PRENATAL CARE IN SECOND TRIMESTER: Primary | ICD-10-CM

## 2020-02-04 DIAGNOSIS — A08.4 VIRAL GASTROENTERITIS: ICD-10-CM

## 2020-02-04 DIAGNOSIS — O26.892 RH NEGATIVE STATUS DURING PREGNANCY IN SECOND TRIMESTER: ICD-10-CM

## 2020-02-04 DIAGNOSIS — O09.30 LATE PRENATAL CARE: ICD-10-CM

## 2020-02-04 DIAGNOSIS — Z67.91 RH NEGATIVE STATUS DURING PREGNANCY IN SECOND TRIMESTER: ICD-10-CM

## 2020-02-04 LAB
BASOPHILS # BLD AUTO: 0.02 10*3/MM3 (ref 0–0.2)
BASOPHILS NFR BLD AUTO: 0.2 % (ref 0–1.5)
EOSINOPHIL # BLD AUTO: 0.15 10*3/MM3 (ref 0–0.4)
EOSINOPHIL NFR BLD AUTO: 1.6 % (ref 0.3–6.2)
ERYTHROCYTE [DISTWIDTH] IN BLOOD BY AUTOMATED COUNT: 13.2 % (ref 12.3–15.4)
GLUCOSE 1H P 50 G GLC PO SERPL-MCNC: 111 MG/DL (ref 65–179)
HCT VFR BLD AUTO: 35.8 % (ref 34–46.6)
HGB BLD-MCNC: 12.5 G/DL (ref 12–15.9)
IMM GRANULOCYTES # BLD AUTO: 0.04 10*3/MM3 (ref 0–0.05)
IMM GRANULOCYTES NFR BLD AUTO: 0.4 % (ref 0–0.5)
LYMPHOCYTES # BLD AUTO: 1.04 10*3/MM3 (ref 0.7–3.1)
LYMPHOCYTES NFR BLD AUTO: 11.4 % (ref 19.6–45.3)
MCH RBC QN AUTO: 32.8 PG (ref 26.6–33)
MCHC RBC AUTO-ENTMCNC: 34.9 G/DL (ref 31.5–35.7)
MCV RBC AUTO: 94 FL (ref 79–97)
MONOCYTES # BLD AUTO: 0.59 10*3/MM3 (ref 0.1–0.9)
MONOCYTES NFR BLD AUTO: 6.5 % (ref 5–12)
NEUTROPHILS # BLD AUTO: 7.28 10*3/MM3 (ref 1.7–7)
NEUTROPHILS NFR BLD AUTO: 79.9 % (ref 42.7–76)
NRBC BLD AUTO-RTO: 0 /100 WBC (ref 0–0.2)
PLATELET # BLD AUTO: 110 10*3/MM3 (ref 140–450)
RBC # BLD AUTO: 3.81 10*6/MM3 (ref 3.77–5.28)
WBC # BLD AUTO: 9.12 10*3/MM3 (ref 3.4–10.8)

## 2020-02-04 PROCEDURE — 99213 OFFICE O/P EST LOW 20 MIN: CPT | Performed by: OBSTETRICS & GYNECOLOGY

## 2020-02-04 NOTE — PROGRESS NOTES
Chief Complaint   Patient presents with   • Routine Prenatal Visit     Glucola done today, c/o nausea and vomiting.       HPI:   Alyson is a  currently at 26w1d who today reports the following:  Contractions - No; Leaking - No; Vaginal bleeding -  No; Swelling of extremities - No. Good fetal movement - YES.    ROS:  GI: Nausea - YES; Constipation - No; Diarrhea - YES. RUQ pain - No    Neuro: Headache - No; Visual disturbances - No.    Pertinent items are noted in HPI, all other systems reviewed and negative    Review of History:  The following portions of the patient's history were reviewed and updated as appropriate:problem list, current medications, allergies, past family history, past medical history, past social history and past surgical history.    Current Outpatient Medications on File Prior to Visit   Medication Sig Dispense Refill   • docusate sodium 100 MG capsule Take 1 capsule by mouth 2 (Two) Times a Day As Needed for Constipation. 60 each 6   • Prenatal Vit-Fe Fumarate-FA (PRENATAL, CLASSIC, VITAMIN) 28-0.8 MG tablet tablet Take  by mouth Daily.     • promethazine (PHENERGAN) 25 MG tablet Take 1 tablet by mouth Every 6 (Six) Hours As Needed for Nausea or Vomiting. 20 tablet 0   • senna-docusate sodium (SENOKOT-S) 8.6-50 MG tablet Take 2 tablets by mouth Daily. 30 tablet 1   • HYDROcodone-acetaminophen (NORCO) 5-325 MG per tablet Take 1 tablet by mouth Every 6 (Six) Hours As Needed for Moderate Pain 30 tablet 0   • ibuprofen (ADVIL,MOTRIN) 600 MG tablet Take 1 tablet by mouth Every 6 (Six) Hours As Needed for Mild Pain . 30 tablet 0     No current facility-administered medications on file prior to visit.        EXAM:  /72   Wt 71.7 kg (158 lb)   LMP 2019   BMI 28.00 kg/m²     Gen: NAD, conversant  Pulm: No use of accessory muscles, normal respirations  Abdomen: Gravid, nontender, size = dates, + fetal cardiac activity  Ext: no edema, no rashes, WWP  Gait: normal for  pregnancy  Psych: Mood, insight, judgement intact  SVE: Not performed     Lab Results   Component Value Date    ABO B 12/17/2019    RH Negative 12/17/2019    ABSCRN Negative 12/17/2019       Social History    Tobacco Use      Smoking status: Current Every Day Smoker        Packs/day: 0.25        Types: Cigarettes        Start date: 2010      Smokeless tobacco: Never Used      Tobacco comment: Smokes about 3 cigarettes per day      I have reviewed the prenatal labs and previous ultrasounds today.    MDM:  Diagnosis: Supervision of low risk pregnancy   Viral gastroenteritis  Thrombocytopenia, mild  Rh NEG   Tests/Orders/Rx today: Orders Placed This Encounter   Procedures   • Gestational Screen 1 Hr ([facility])   • CBC & Differential     Order Specific Question:   Manual Differential     Answer:   No     Meds Ordered: none   Topics discussed: Platelets   Viral gastroenteritis  Prenatal care milestones   Tests next visit: rhogam   Next visit: 2 week(s)     Howard Bunch MD  Obstetrics and Gynecology  [facility]

## 2020-02-21 ENCOUNTER — ROUTINE PRENATAL (OUTPATIENT)
Dept: OBSTETRICS AND GYNECOLOGY | Facility: CLINIC | Age: 25
End: 2020-02-21

## 2020-02-21 VITALS — WEIGHT: 162 LBS | DIASTOLIC BLOOD PRESSURE: 78 MMHG | SYSTOLIC BLOOD PRESSURE: 122 MMHG | BODY MASS INDEX: 28.7 KG/M2

## 2020-02-21 DIAGNOSIS — O09.30 LATE PRENATAL CARE: Primary | ICD-10-CM

## 2020-02-21 DIAGNOSIS — Z34.83 ENCOUNTER FOR SUPERVISION OF OTHER NORMAL PREGNANCY IN THIRD TRIMESTER: ICD-10-CM

## 2020-02-21 PROCEDURE — 99213 OFFICE O/P EST LOW 20 MIN: CPT | Performed by: MIDWIFE

## 2020-02-21 PROCEDURE — 96372 THER/PROPH/DIAG INJ SC/IM: CPT | Performed by: MIDWIFE

## 2020-02-21 RX ORDER — PRENATAL VIT NO.126/IRON/FOLIC 28MG-0.8MG
1 TABLET ORAL DAILY
Qty: 30 TABLET | Refills: 6 | Status: SHIPPED | OUTPATIENT
Start: 2020-02-21

## 2020-02-21 NOTE — PROGRESS NOTES
Chief Complaint   Patient presents with   • Routine Prenatal Visit     No complaints   • Injections     Rhogam, office supplied.       HPI: Alyson is a  currently at 28w4d who today reports the following:   Leaking - No; Heartburn - No. Fetal movement - YES. She has been having some mild intermittent lower abdominal discomfort for the past week. She states sometimes it is shooting and sharp and other times dull and achy. She denies contractions. She does have some intermittent constipation and has been taking Colace once daily    ROS:   GI:   Nausea - No; Constipation - No;    Neuro:  Headache - No; Visual disturbances - No.    Social History    Tobacco Use      Smoking status: Current Every Day Smoker        Packs/day: 0.25        Types: Cigarettes        Start date:       Smokeless tobacco: Never Used      Tobacco comment: Smokes about 3 cigarettes per day      EXAM:   Vitals:  See prenatal flowsheet as noted and reviewed /78, Wt +4#   Abdomen:   See prenatal flowsheet as noted and reviewed, soft, nontender   Pelvic:  See prenatal flowsheet as noted and reviewed   Urine:  See prenatal flowsheet as noted and reviewed    Lab Results   Component Value Date    ABO B 2019    RH Negative 2019    ABSCRN Negative 2019       MDM:  Impression: Supervision of low risk pregnancy  Rh negative  Round ligament pain   Constipation   Tests done today: Rhogam   Topics discussed: kick counts and fetal movement   labor signs and symptoms  Tylenol, maternity support. Colace BID   Tests next visit: none                RTO:                        2 weeks    This note was electronically signed.  Yani Barriga, APRN  2020

## 2020-03-11 ENCOUNTER — ROUTINE PRENATAL (OUTPATIENT)
Dept: OBSTETRICS AND GYNECOLOGY | Facility: CLINIC | Age: 25
End: 2020-03-11

## 2020-03-11 VITALS — BODY MASS INDEX: 29.59 KG/M2 | DIASTOLIC BLOOD PRESSURE: 72 MMHG | SYSTOLIC BLOOD PRESSURE: 118 MMHG | WEIGHT: 167 LBS

## 2020-03-11 DIAGNOSIS — K21.9 GASTROESOPHAGEAL REFLUX DISEASE WITHOUT ESOPHAGITIS: ICD-10-CM

## 2020-03-11 DIAGNOSIS — Z67.91 RH NEGATIVE STATUS DURING PREGNANCY IN THIRD TRIMESTER: ICD-10-CM

## 2020-03-11 DIAGNOSIS — Z34.93 PRENATAL CARE IN THIRD TRIMESTER: Primary | ICD-10-CM

## 2020-03-11 DIAGNOSIS — O21.9 NAUSEA AND VOMITING IN PREGNANCY: ICD-10-CM

## 2020-03-11 DIAGNOSIS — O26.893 RH NEGATIVE STATUS DURING PREGNANCY IN THIRD TRIMESTER: ICD-10-CM

## 2020-03-11 PROCEDURE — 99214 OFFICE O/P EST MOD 30 MIN: CPT | Performed by: OBSTETRICS & GYNECOLOGY

## 2020-03-11 RX ORDER — FAMOTIDINE 20 MG/1
20 TABLET, FILM COATED ORAL DAILY
Qty: 30 TABLET | Refills: 1 | Status: SHIPPED | OUTPATIENT
Start: 2020-03-11 | End: 2021-03-11

## 2020-03-11 RX ORDER — PROMETHAZINE HYDROCHLORIDE 25 MG/1
25 TABLET ORAL EVERY 6 HOURS PRN
Qty: 20 TABLET | Refills: 2 | Status: SHIPPED | OUTPATIENT
Start: 2020-03-11

## 2020-03-22 NOTE — PROGRESS NOTES
Chief Complaint   Patient presents with   • Routine Prenatal Visit     No complaints       HPI:   Alyson is a  currently at 31w2d who today reports the following:  Contractions - No; Leaking - No; Vaginal bleeding -  No; Swelling of extremities - No. Good fetal movement - YES.    ROS:  GI: Nausea - YES; Constipation - No; Diarrhea - No. RUQ pain - No    Neuro: Headache - No; Visual disturbances - No.    Pertinent items are noted in HPI, all other systems reviewed and negative    Review of History:  The following portions of the patient's history were reviewed and updated as appropriate:problem list, current medications, allergies, past family history, past medical history, past social history and past surgical history.    Current Outpatient Medications on File Prior to Visit   Medication Sig Dispense Refill   • docusate sodium 100 MG capsule Take 1 capsule by mouth 2 (Two) Times a Day As Needed for Constipation. 60 each 6   • HYDROcodone-acetaminophen (NORCO) 5-325 MG per tablet Take 1 tablet by mouth Every 6 (Six) Hours As Needed for Moderate Pain 30 tablet 0   • ibuprofen (ADVIL,MOTRIN) 600 MG tablet Take 1 tablet by mouth Every 6 (Six) Hours As Needed for Mild Pain . 30 tablet 0   • Prenatal Vit-Fe Fumarate-FA (PRENATAL, CLASSIC, VITAMIN) 28-0.8 MG tablet tablet Take 1 tablet by mouth Daily. 30 tablet 6   • senna-docusate sodium (SENOKOT-S) 8.6-50 MG tablet Take 2 tablets by mouth Daily. 30 tablet 1     No current facility-administered medications on file prior to visit.        EXAM:  /72   Wt 75.8 kg (167 lb)   LMP 2019   BMI 29.59 kg/m²     Gen: NAD, conversant  Pulm: No use of accessory muscles, normal respirations  Abdomen: Gravid, nontender, size = dates, + fetal cardiac activity  Ext: no edema, no rashes, WWP  Gait: normal for pregnancy  Psych: Mood, insight, judgement intact  SVE: Not performed     Lab Results   Component Value Date    ABO B 2019    RH Negative 2019     ABSCRN Negative 12/17/2019       Social History    Tobacco Use      Smoking status: Current Every Day Smoker        Packs/day: 0.25        Types: Cigarettes        Start date: 2010      Smokeless tobacco: Never Used      Tobacco comment: Smokes about 3 cigarettes per day      I have reviewed the prenatal labs and previous ultrasounds today.    MDM:  Diagnosis: Supervision of low risk pregnancy   Nausea  GERD  Thrombocytopenia, mild  Rh NEG   Tests/Orders/Rx today: No orders of the defined types were placed in this encounter.    Meds Ordered: Phenergan + Pepcid   Topics discussed: Platelets - repeat next visit  Nausea and GERD  Prenatal care milestones  Rhogam on 2/21   Tests next visit: U/S for EFW   Next visit: 1 week(s)     Howard Bunch MD  Obstetrics and Gynecology  [facility]

## 2020-03-23 ENCOUNTER — ROUTINE PRENATAL (OUTPATIENT)
Dept: OBSTETRICS AND GYNECOLOGY | Facility: CLINIC | Age: 25
End: 2020-03-23

## 2020-03-23 VITALS — DIASTOLIC BLOOD PRESSURE: 70 MMHG | BODY MASS INDEX: 31.01 KG/M2 | WEIGHT: 175 LBS | SYSTOLIC BLOOD PRESSURE: 122 MMHG

## 2020-03-23 DIAGNOSIS — D69.3 IMMUNE THROMBOCYTOPENIA AFFECTING PREGNANCY IN THIRD TRIMESTER (HCC): ICD-10-CM

## 2020-03-23 DIAGNOSIS — Z34.93 PRENATAL CARE IN THIRD TRIMESTER: Primary | ICD-10-CM

## 2020-03-23 DIAGNOSIS — O99.113 IMMUNE THROMBOCYTOPENIA AFFECTING PREGNANCY IN THIRD TRIMESTER (HCC): ICD-10-CM

## 2020-03-23 PROCEDURE — 99213 OFFICE O/P EST LOW 20 MIN: CPT | Performed by: NURSE PRACTITIONER

## 2020-03-23 RX ORDER — MAGNESIUM OXIDE 400 MG/1
400 TABLET ORAL DAILY
Qty: 30 TABLET | Refills: 0 | Status: SHIPPED | OUTPATIENT
Start: 2020-03-23

## 2020-03-23 NOTE — PROGRESS NOTES
[id number]  Chief Complaint   Patient presents with   • Routine Prenatal Visit     growth scan today, c/o swelling         HPI  Alyson is a  currently at 33w0d who today reports the following:  Still smoking - trying to decrease   Good FM   Has had headaches daily for some time now - tylenol no help          Contractions - No;   Leaking - No;   Vaginal bleeding -  No;   Swelling of extremities - yes - at end of day.       ROS:        GI: Nausea - No;   Constipation - No;   Diarrhea - No    Neuro: Headache - YES;   Visual change - No    Pertinent items are noted in HPI, all other systems reviewed and negative     Social History     Socioeconomic History   • Marital status: Single     Spouse name: Not on file   • Number of children: Not on file   • Years of education: Not on file   • Highest education level: Not on file   Tobacco Use   • Smoking status: Current Every Day Smoker     Packs/day: 0.25     Types: Cigarettes     Start date:    • Smokeless tobacco: Never Used   • Tobacco comment: Smokes about 3 cigarettes per day   Substance and Sexual Activity   • Alcohol use: No   • Drug use: No          EXAM  /70   Wt 79.4 kg (175 lb)   LMP 2019   BMI 31.01 kg/m²  -See Prenatal Assessment  General Appearance:  Pleasant  Lungs: Breathing unlabored  Abdomen:  See flow sheet for Fundal ht, FM, FHT's  LE: Neg edema  V/E: Not performed     Lab Results   Component Value Date    ABO B 2019    RH Negative 2019    ABSCRN Negative 2019       MDM  Impression: Pregnancy with active problem(s) &/or complication(s)   Headaches   Tobacco use   Mild thrombocytopenia   Tests done today: U/S  & rev'd results   63% growth    Topics discussed: continue to note good FM  T-dap   Nutririon reviewed  Tobacco use - continue to decrease use  Magnesium oxide  PIH precautions  encouraged questions - call prn    Tests next visit: Lo platelets         Current Outpatient Medications:   •  docusate sodium 100  MG capsule, Take 1 capsule by mouth 2 (Two) Times a Day As Needed for Constipation., Disp: 60 each, Rfl: 6  •  famotidine (PEPCID) 20 MG tablet, Take 1 tablet by mouth Daily., Disp: 30 tablet, Rfl: 1  •  HYDROcodone-acetaminophen (NORCO) 5-325 MG per tablet, Take 1 tablet by mouth Every 6 (Six) Hours As Needed for Moderate Pain, Disp: 30 tablet, Rfl: 0  •  ibuprofen (ADVIL,MOTRIN) 600 MG tablet, Take 1 tablet by mouth Every 6 (Six) Hours As Needed for Mild Pain ., Disp: 30 tablet, Rfl: 0  •  Prenatal Vit-Fe Fumarate-FA (PRENATAL, CLASSIC, VITAMIN) 28-0.8 MG tablet tablet, Take 1 tablet by mouth Daily., Disp: 30 tablet, Rfl: 6  •  promethazine (PHENERGAN) 25 MG tablet, Take 1 tablet by mouth Every 6 (Six) Hours As Needed for Nausea or Vomiting., Disp: 20 tablet, Rfl: 2  •  senna-docusate sodium (SENOKOT-S) 8.6-50 MG tablet, Take 2 tablets by mouth Daily., Disp: 30 tablet, Rfl: 1    Past Surgical History:   Procedure Laterality Date   • WISDOM TOOTH EXTRACTION         OB History        3    Para   2    Term   2            AB        Living   2       SAB        TAB        Ectopic        Molar        Multiple        Live Births   2          Obstetric Comments    - Fady   -              Past Medical History:   Diagnosis Date   • Gout    • Rh negative status during pregnancy 2019       Family History   Problem Relation Age of Onset   • Cancer Mother    • Arthritis Father    • Cancer Father    • Diabetes Paternal Aunt    • Migraines Paternal Aunt    • Diabetes Paternal Grandmother

## 2020-04-06 ENCOUNTER — ROUTINE PRENATAL (OUTPATIENT)
Dept: OBSTETRICS AND GYNECOLOGY | Facility: CLINIC | Age: 25
End: 2020-04-06

## 2020-04-06 VITALS — WEIGHT: 174 LBS | BODY MASS INDEX: 30.83 KG/M2 | SYSTOLIC BLOOD PRESSURE: 114 MMHG | DIASTOLIC BLOOD PRESSURE: 68 MMHG

## 2020-04-06 DIAGNOSIS — Z34.93 PRENATAL CARE IN THIRD TRIMESTER: Primary | ICD-10-CM

## 2020-04-06 DIAGNOSIS — O99.113 BENIGN GESTATIONAL THROMBOCYTOPENIA IN THIRD TRIMESTER (HCC): ICD-10-CM

## 2020-04-06 DIAGNOSIS — D69.6 BENIGN GESTATIONAL THROMBOCYTOPENIA IN THIRD TRIMESTER (HCC): ICD-10-CM

## 2020-04-06 LAB
BASOPHILS # BLD AUTO: 0.03 10*3/MM3 (ref 0–0.2)
BASOPHILS NFR BLD AUTO: 0.3 % (ref 0–1.5)
EOSINOPHIL # BLD AUTO: 0.15 10*3/MM3 (ref 0–0.4)
EOSINOPHIL NFR BLD AUTO: 1.3 % (ref 0.3–6.2)
ERYTHROCYTE [DISTWIDTH] IN BLOOD BY AUTOMATED COUNT: 12.3 % (ref 12.3–15.4)
HCT VFR BLD AUTO: 37.3 % (ref 34–46.6)
HGB BLD-MCNC: 12.7 G/DL (ref 12–15.9)
IMM GRANULOCYTES # BLD AUTO: 0.04 10*3/MM3 (ref 0–0.05)
IMM GRANULOCYTES NFR BLD AUTO: 0.3 % (ref 0–0.5)
LYMPHOCYTES # BLD AUTO: 1.35 10*3/MM3 (ref 0.7–3.1)
LYMPHOCYTES NFR BLD AUTO: 11.5 % (ref 19.6–45.3)
MCH RBC QN AUTO: 32.5 PG (ref 26.6–33)
MCHC RBC AUTO-ENTMCNC: 34 G/DL (ref 31.5–35.7)
MCV RBC AUTO: 95.4 FL (ref 79–97)
MONOCYTES # BLD AUTO: 0.68 10*3/MM3 (ref 0.1–0.9)
MONOCYTES NFR BLD AUTO: 5.8 % (ref 5–12)
NEUTROPHILS # BLD AUTO: 9.51 10*3/MM3 (ref 1.7–7)
NEUTROPHILS NFR BLD AUTO: 80.8 % (ref 42.7–76)
NRBC BLD AUTO-RTO: 0 /100 WBC (ref 0–0.2)
PLATELET # BLD AUTO: 126 10*3/MM3 (ref 140–450)
RBC # BLD AUTO: 3.91 10*6/MM3 (ref 3.77–5.28)
WBC # BLD AUTO: 11.76 10*3/MM3 (ref 3.4–10.8)

## 2020-04-06 PROCEDURE — 99213 OFFICE O/P EST LOW 20 MIN: CPT | Performed by: OBSTETRICS & GYNECOLOGY

## 2020-04-13 NOTE — PROGRESS NOTES
Chief Complaint   Patient presents with   • Routine Prenatal Visit     Headaches       HPI:   Alyson is a  currently at 35w0d who today reports the following:  Contractions - No; Leaking - No; Vaginal bleeding -  No; Swelling of extremities - No. Good fetal movement - YES.    ROS:  GI: Nausea - YES; Constipation - No; Diarrhea - No. RUQ pain - No    Neuro: Headache - YES; Visual disturbances - No.    Pertinent items are noted in HPI, all other systems reviewed and negative    Review of History:  The following portions of the patient's history were reviewed and updated as appropriate:problem list, current medications, allergies, past family history, past medical history, past social history and past surgical history.    Current Outpatient Medications on File Prior to Visit   Medication Sig Dispense Refill   • docusate sodium 100 MG capsule Take 1 capsule by mouth 2 (Two) Times a Day As Needed for Constipation. 60 each 6   • famotidine (PEPCID) 20 MG tablet Take 1 tablet by mouth Daily. 30 tablet 1   • HYDROcodone-acetaminophen (NORCO) 5-325 MG per tablet Take 1 tablet by mouth Every 6 (Six) Hours As Needed for Moderate Pain 30 tablet 0   • ibuprofen (ADVIL,MOTRIN) 600 MG tablet Take 1 tablet by mouth Every 6 (Six) Hours As Needed for Mild Pain . 30 tablet 0   • magnesium oxide (MAG-OX) 400 MG tablet Take 1 tablet by mouth Daily. 30 tablet 0   • magnesium oxide (MAGOX) 400 (241.3 Mg) MG tablet tablet      • Prenatal Vit-Fe Fumarate-FA (PRENATAL, CLASSIC, VITAMIN) 28-0.8 MG tablet tablet Take 1 tablet by mouth Daily. 30 tablet 6   • promethazine (PHENERGAN) 25 MG tablet Take 1 tablet by mouth Every 6 (Six) Hours As Needed for Nausea or Vomiting. 20 tablet 2   • senna-docusate sodium (SENOKOT-S) 8.6-50 MG tablet Take 2 tablets by mouth Daily. 30 tablet 1   • Tdap (BOOSTRIX) 5-2.5-18.5 LF-MCG/0.5 injection Inject per protocol 0.5 mL 0     No current facility-administered medications on file prior to visit.         EXAM:  /68   Wt 78.9 kg (174 lb)   LMP 2019   BMI 30.83 kg/m²     Gen: NAD, conversant  Pulm: No use of accessory muscles, normal respirations  Abdomen: Gravid, nontender, size = dates, + fetal cardiac activity  Ext: no edema, no rashes, WWP  Gait: normal for pregnancy  Psych: Mood, insight, judgement intact  SVE: Not performed     Lab Results   Component Value Date    ABO B 2019    RH Negative 2019    ABSCRN Negative 2019       Social History    Tobacco Use      Smoking status: Current Every Day Smoker        Packs/day: 0.25        Types: Cigarettes        Start date:       Smokeless tobacco: Never Used      Tobacco comment: Smokes about 3 cigarettes per day      I have reviewed the prenatal labs and previous ultrasounds today.    MDM:  Diagnosis: Supervision of low risk pregnancy   Headaches  Thrombocytopenia, mild  Rh NEG   Tests/Orders/Rx today: Orders Placed This Encounter   Procedures   • CBC & Differential     Order Specific Question:   Manual Differential     Answer:   No     Order Specific Question:   [facility] Has the patient fasted?     Answer:   No     Meds Ordered: none   Topics discussed: kick counts and fetal movement  PIH precautions   labor signs and symptoms  Platelets - repeat today  Headaches  Rhogam on    Tests next visit: GBS testing   Next visit: 1 week(s)     Howard Bunch MD  Obstetrics and Gynecology  [facility]

## 2020-04-17 ENCOUNTER — ROUTINE PRENATAL (OUTPATIENT)
Dept: OBSTETRICS AND GYNECOLOGY | Facility: CLINIC | Age: 25
End: 2020-04-17

## 2020-04-17 VITALS — BODY MASS INDEX: 31.36 KG/M2 | WEIGHT: 177 LBS | DIASTOLIC BLOOD PRESSURE: 66 MMHG | SYSTOLIC BLOOD PRESSURE: 118 MMHG

## 2020-04-17 DIAGNOSIS — Z34.83 ENCOUNTER FOR SUPERVISION OF OTHER NORMAL PREGNANCY IN THIRD TRIMESTER: ICD-10-CM

## 2020-04-17 DIAGNOSIS — Z36.85 ANTENATAL SCREENING FOR STREPTOCOCCUS B: Primary | ICD-10-CM

## 2020-04-17 DIAGNOSIS — K59.00 CONSTIPATION IN PREGNANCY IN THIRD TRIMESTER: ICD-10-CM

## 2020-04-17 DIAGNOSIS — O99.613 CONSTIPATION IN PREGNANCY IN THIRD TRIMESTER: ICD-10-CM

## 2020-04-17 PROCEDURE — 99213 OFFICE O/P EST LOW 20 MIN: CPT | Performed by: MIDWIFE

## 2020-04-17 NOTE — PROGRESS NOTES
Chief Complaint   Patient presents with   • Routine Prenatal Visit     GBS done today, c/o back pain and constipation, is taking stool softner but with no relief       HPI: Alyson is a  currently at 36w4d who today reports the following:  Contractions - some back pain and mild tightening in abdomen; Leaking - No; Vaginal bleeding -  No; Swelling of extremities - No; Baby is active - YES   She is currently taking stool softeners and had a small soft bowel movement this morning    ROS:   GI:   Nausea - No; Constipation - YES   Neuro:  Headache - No; Visual disturbances - No.    Social History    Tobacco Use      Smoking status: Current Every Day Smoker        Packs/day: 0.25        Types: Cigarettes        Start date:       Smokeless tobacco: Never Used      Tobacco comment: Smokes about 3 cigarettes per day      EXAM:   Vitals:  See prenatal flowsheet, /66, Wt +3#   Abdomen:   See prenatal flowsheet as noted and reviewed, soft, nontender   Pelvic:  1/thick/-2 posterior soft   Urine:  See prenatal flowsheet as noted and reviewed    MDM:  Impression: Supervision of low risk pregnancy  Constipation in pregnancy   Tests done today: GBS testing   Topics discussed: kick counts and fetal movement  labor signs and symptoms  Increase fiber, increase water, Miralax or fleets enema PRN   Tests next visit: none   Next visit: 1 weeks     This note was electronically signed.  Yani Barriga, ROBIN  2020

## 2020-04-19 LAB — GP B STREP DNA SPEC QL NAA+PROBE: NEGATIVE

## 2020-04-24 ENCOUNTER — ROUTINE PRENATAL (OUTPATIENT)
Dept: OBSTETRICS AND GYNECOLOGY | Facility: CLINIC | Age: 25
End: 2020-04-24

## 2020-04-24 VITALS — DIASTOLIC BLOOD PRESSURE: 74 MMHG | WEIGHT: 185 LBS | BODY MASS INDEX: 32.78 KG/M2 | SYSTOLIC BLOOD PRESSURE: 120 MMHG

## 2020-04-24 DIAGNOSIS — Z34.93 THIRD TRIMESTER PREGNANCY: Primary | ICD-10-CM

## 2020-04-24 PROCEDURE — 99213 OFFICE O/P EST LOW 20 MIN: CPT | Performed by: OBSTETRICS & GYNECOLOGY

## 2020-04-24 RX ORDER — OMEPRAZOLE 20 MG/1
20 CAPSULE, DELAYED RELEASE ORAL DAILY
Qty: 30 CAPSULE | Refills: 2 | Status: SHIPPED | OUTPATIENT
Start: 2020-04-24

## 2020-04-24 NOTE — PROGRESS NOTES
Chief Complaint   Patient presents with   • Routine Prenatal Visit     c/o of swelling in feet  , Good fetal movement         HPI:   , 37w4d gestation reports doing well    ROS:  See Prenatal Episode/Flowsheet  /74   Wt 83.9 kg (185 lb)   LMP 2019   BMI 32.78 kg/m²      EXAM:  EXTREMITIES:  No swelling-See Prenatal Episode/Flowsheet    ABDOMEN:  FHTs/Movement noted-See Prenatal Episode/Flowsheet    URINE GLUCOSE/PROTEIN:  See Prenatal Episode/Flowsheet    PELVIC EXAM:  See Prenatal Episode/Flowsheet  CV:  Lungs:  GYN:    MDM:    Lab Results   Component Value Date    HGB 12.7 2020    RUBELLAABIGG 2.26 2019    HEPBSAG Negative 2019    ABO B 2019    RH Negative 2019    ABSCRN Negative 2019    VCR1XSH3 Non Reactive 2019    HEPCVIRUSABY <0.1 2019    STREPGPB Negative 2020    URINECX Final report 2019       U/S: 64% @ 32 weeks    1. IUP 37w4d  2. Routine care   3. Increase hydration  4. GBS negative  5. GERD: prilosec 20mg po qday  6. Gestatinal Thrombocyotpenia: plt 126 2 weeks

## 2020-05-01 ENCOUNTER — ROUTINE PRENATAL (OUTPATIENT)
Dept: OBSTETRICS AND GYNECOLOGY | Facility: CLINIC | Age: 25
End: 2020-05-01

## 2020-05-01 VITALS — WEIGHT: 178 LBS | BODY MASS INDEX: 31.54 KG/M2 | DIASTOLIC BLOOD PRESSURE: 72 MMHG | SYSTOLIC BLOOD PRESSURE: 128 MMHG

## 2020-05-01 DIAGNOSIS — K21.9 GASTROESOPHAGEAL REFLUX DISEASE WITHOUT ESOPHAGITIS: ICD-10-CM

## 2020-05-01 DIAGNOSIS — O09.93 ENCOUNTER FOR SUPERVISION OF HIGH RISK PREGNANCY IN THIRD TRIMESTER, ANTEPARTUM: Primary | ICD-10-CM

## 2020-05-01 DIAGNOSIS — O99.113 BENIGN GESTATIONAL THROMBOCYTOPENIA IN THIRD TRIMESTER (HCC): ICD-10-CM

## 2020-05-01 DIAGNOSIS — Z30.09 ENCOUNTER FOR GENERAL COUNSELING AND ADVICE ON CONTRACEPTIVE MANAGEMENT: ICD-10-CM

## 2020-05-01 DIAGNOSIS — D69.6 BENIGN GESTATIONAL THROMBOCYTOPENIA IN THIRD TRIMESTER (HCC): ICD-10-CM

## 2020-05-01 LAB
ERYTHROCYTE [DISTWIDTH] IN BLOOD BY AUTOMATED COUNT: 12.6 % (ref 12.3–15.4)
HCT VFR BLD AUTO: 37.4 % (ref 34–46.6)
HGB BLD-MCNC: 12.9 G/DL (ref 12–15.9)
MCH RBC QN AUTO: 32.5 PG (ref 26.6–33)
MCHC RBC AUTO-ENTMCNC: 34.5 G/DL (ref 31.5–35.7)
MCV RBC AUTO: 94.2 FL (ref 79–97)
PLATELET # BLD AUTO: 137 10*3/MM3 (ref 140–450)
RBC # BLD AUTO: 3.97 10*6/MM3 (ref 3.77–5.28)
WBC # BLD AUTO: 12.16 10*3/MM3 (ref 3.4–10.8)

## 2020-05-01 PROCEDURE — 99214 OFFICE O/P EST MOD 30 MIN: CPT | Performed by: OBSTETRICS & GYNECOLOGY

## 2020-05-01 NOTE — PROGRESS NOTES
Chief Complaint   Patient presents with   • Routine Prenatal Visit       HPI: Alyson is a  currently at 38w4d who today reports the following:  Contractions - YES - but less than 4/hour AND no associated change in vaginal discharge; Leaking - No; Vaginal bleeding -  No; Heartburn - improved.  Patient is without complaints.  Patient does report irregular contractions.  Patient has not been able to time them.  Patient denies any leaking of fluid.  Patient denies any spotting or bleeding.  Patient does report good fetal movement.  Patient is desiring tubal ligation.  Pt is certain she no longer desires childbearing.  Patient has not signed tubal papers.  Patient denies any abnormal bleeding.  Patient does have a history of thrombocytopenia.  Patient has not had recent labs performed.  ROS:   GI:   Nausea - No; Constipation - No; Diarrhea - No.   Neuro:  Headache - No; Visual disturbances - No.    EXAM:   Vitals:  See prenatal flowsheet as noted and reviewed   General: Alert, cooperative, and does not appear in any distress   Lungs:  Respirations regular, even and unlabored   Abdomen:   See prenatal flowsheet as noted and reviewed     Uterus gravid, non-tender; no palpable masses     No guarding or rebound tenderness   Pelvic:  See prenatal flowsheet as noted and reviewed   Ext:  See prenatal flowsheet as noted and reviewed     Moves extremities well, no cyanosis and no redness   Skin:  No bleeding, bruising or rash and no lesions noted   Psych:  Normal mood and affect, oriented and thought content appropriate   Urine:  See prenatal flowsheet as noted and reviewed    Prenatal Labs  Lab Results   Component Value Date    HGB 12.7 2020    RUBELLAABIGG 2.26 2019    HEPBSAG Negative 2019    ABO B 2019    RH Negative 2019    ABSCRN Negative 2019    [id number] Non Reactive 2019    HEPCVIRUSABY <0.1 2019    STREPGPB Negative 2020    URINECX Final report 2019        MDM:  Impression: Supervision of high risk pregnancy  Thrombocytopenia   Desires tubal  GERD  GBS negative   Tests done today: CBC   Topics discussed: induction of labor - pt does not want induction at this time; f/u as noted.  CBC today; pt to call for results.  Plan pending results.  kick counts and fetal movement  labor signs and symptoms  PIH precautions  Tubal - I have reviewed with the patient the risks, complications, benefits, and other alternatives to surgery in detail.  The risks reviewed include but not limited to pain, numbness, swelling, and scarring from surgery.  Bleeding during or after the surgery which may require the need for blood transfusions or other treatment and surgery with additional risk as discussed.  Infection including wound infection, poor healing or reopening of the incision(s) as well as pelvic and/or abdominal infection, urinary tract infection, pneumonia, and sepsis.  Damage to bladder, ureters, bowel, and other pelvic or abdominal structures which may be discovered during the procedure or later requiring additional treatment and surgery.  Damage to ovaries, uterus, or other nearby structures.  Damage to nerves, blood vessels, and/or other structures in the surgical area.  Risk of DVTs, embolus from blood clots or gas if used during procedure.  The permanent nature of the procedure was discussed. The failure rate of the procedure with subsequent pregnancy with higher rate of ectopic gestation if tubal fails was also discussed. The patient was informed regarding complications and reactions to the anesthetic including heart attack, cardiac arrest, stroke, and death. The possibility of not being able to perform the procedure through the laparoscope or a small incision, requiring laparotomy was also discussed.  All questions were answered.  Patient is in agreement with the planned procedure.  Pt to sign tubal papers today.  Pt understands this can not be done until 6 weeks  pp.  Continue prilosec   Tests next visit: none   Total time spent today with Alyson  was 30 minutes (level 4).  Greater than 50% of the time was spent face to face coordinating and planning care, answering her questions and counseling regarding pathophysiology of her presenting problem along with plans for any diagnositc work-up and treatment.  This note was electronically signed.  Roxana Wan M.D.

## 2020-05-05 ENCOUNTER — ROUTINE PRENATAL (OUTPATIENT)
Dept: OBSTETRICS AND GYNECOLOGY | Facility: CLINIC | Age: 25
End: 2020-05-05

## 2020-05-05 ENCOUNTER — PREP FOR SURGERY (OUTPATIENT)
Dept: OTHER | Facility: HOSPITAL | Age: 25
End: 2020-05-05

## 2020-05-05 VITALS — BODY MASS INDEX: 31.89 KG/M2 | SYSTOLIC BLOOD PRESSURE: 124 MMHG | DIASTOLIC BLOOD PRESSURE: 70 MMHG | WEIGHT: 180 LBS

## 2020-05-05 DIAGNOSIS — Z34.93 THIRD TRIMESTER PREGNANCY: Primary | ICD-10-CM

## 2020-05-05 PROCEDURE — 99213 OFFICE O/P EST LOW 20 MIN: CPT | Performed by: OBSTETRICS & GYNECOLOGY

## 2020-05-05 RX ORDER — PROMETHAZINE HYDROCHLORIDE 25 MG/ML
12.5 INJECTION, SOLUTION INTRAMUSCULAR; INTRAVENOUS EVERY 4 HOURS PRN
Status: CANCELLED | OUTPATIENT
Start: 2020-05-05 | End: 2020-05-06

## 2020-05-05 RX ORDER — SODIUM CHLORIDE 0.9 % (FLUSH) 0.9 %
3 SYRINGE (ML) INJECTION EVERY 12 HOURS SCHEDULED
Status: CANCELLED | OUTPATIENT
Start: 2020-05-05

## 2020-05-05 RX ORDER — MORPHINE SULFATE 4 MG/ML
4 INJECTION, SOLUTION INTRAMUSCULAR; INTRAVENOUS EVERY 4 HOURS PRN
Status: CANCELLED | OUTPATIENT
Start: 2020-05-05 | End: 2020-05-15

## 2020-05-05 RX ORDER — LIDOCAINE HYDROCHLORIDE 10 MG/ML
5 INJECTION, SOLUTION EPIDURAL; INFILTRATION; INTRACAUDAL; PERINEURAL AS NEEDED
Status: CANCELLED | OUTPATIENT
Start: 2020-05-05

## 2020-05-05 RX ORDER — PROMETHAZINE HYDROCHLORIDE 25 MG/ML
25 INJECTION, SOLUTION INTRAMUSCULAR; INTRAVENOUS EVERY 4 HOURS PRN
Status: CANCELLED | OUTPATIENT
Start: 2020-05-05 | End: 2020-05-06

## 2020-05-05 RX ORDER — ONDANSETRON 2 MG/ML
4 INJECTION INTRAMUSCULAR; INTRAVENOUS ONCE AS NEEDED
Status: CANCELLED | OUTPATIENT
Start: 2020-05-05

## 2020-05-05 RX ORDER — PROMETHAZINE HYDROCHLORIDE 12.5 MG/1
12.5 SUPPOSITORY RECTAL EVERY 4 HOURS PRN
Status: CANCELLED | OUTPATIENT
Start: 2020-05-05 | End: 2020-05-06

## 2020-05-05 RX ORDER — ACETAMINOPHEN 325 MG/1
650 TABLET ORAL ONCE AS NEEDED
Status: CANCELLED | OUTPATIENT
Start: 2020-05-05

## 2020-05-05 RX ORDER — CARBOPROST TROMETHAMINE 250 UG/ML
250 INJECTION, SOLUTION INTRAMUSCULAR ONCE AS NEEDED
Status: CANCELLED | OUTPATIENT
Start: 2020-05-05 | End: 2020-05-06

## 2020-05-05 RX ORDER — MISOPROSTOL 200 UG/1
800 TABLET ORAL AS NEEDED
Status: CANCELLED | OUTPATIENT
Start: 2020-05-05

## 2020-05-05 RX ORDER — OXYTOCIN/0.9 % SODIUM CHLORIDE 30/500 ML
85 PLASTIC BAG, INJECTION (ML) INTRAVENOUS ONCE
Status: CANCELLED | OUTPATIENT
Start: 2020-05-05 | End: 2020-05-05

## 2020-05-05 RX ORDER — SODIUM CHLORIDE 0.9 % (FLUSH) 0.9 %
1-10 SYRINGE (ML) INJECTION AS NEEDED
Status: CANCELLED | OUTPATIENT
Start: 2020-05-05

## 2020-05-05 RX ORDER — MORPHINE SULFATE 2 MG/ML
2 INJECTION, SOLUTION INTRAMUSCULAR; INTRAVENOUS ONCE AS NEEDED
Status: CANCELLED | OUTPATIENT
Start: 2020-05-05

## 2020-05-05 RX ORDER — MORPHINE SULFATE 4 MG/ML
4 INJECTION, SOLUTION INTRAMUSCULAR; INTRAVENOUS ONCE AS NEEDED
Status: CANCELLED | OUTPATIENT
Start: 2020-05-05

## 2020-05-05 RX ORDER — OXYTOCIN/0.9 % SODIUM CHLORIDE 30/500 ML
650 PLASTIC BAG, INJECTION (ML) INTRAVENOUS ONCE
Status: CANCELLED | OUTPATIENT
Start: 2020-05-05 | End: 2020-05-05

## 2020-05-05 RX ORDER — METHYLERGONOVINE MALEATE 0.2 MG/ML
200 INJECTION INTRAVENOUS ONCE AS NEEDED
Status: CANCELLED | OUTPATIENT
Start: 2020-05-05 | End: 2020-05-06

## 2020-05-05 RX ORDER — MORPHINE SULFATE 2 MG/ML
6 INJECTION, SOLUTION INTRAMUSCULAR; INTRAVENOUS
Status: CANCELLED | OUTPATIENT
Start: 2020-05-05 | End: 2020-05-15

## 2020-05-05 RX ORDER — OXYTOCIN/0.9 % SODIUM CHLORIDE 30/500 ML
2-20 PLASTIC BAG, INJECTION (ML) INTRAVENOUS
Status: CANCELLED | OUTPATIENT
Start: 2020-05-05

## 2020-05-05 RX ORDER — SODIUM CHLORIDE, SODIUM LACTATE, POTASSIUM CHLORIDE, CALCIUM CHLORIDE 600; 310; 30; 20 MG/100ML; MG/100ML; MG/100ML; MG/100ML
125 INJECTION, SOLUTION INTRAVENOUS CONTINUOUS
Status: CANCELLED | OUTPATIENT
Start: 2020-05-05

## 2020-05-05 RX ORDER — HYDROCODONE BITARTRATE AND ACETAMINOPHEN 5; 325 MG/1; MG/1
2 TABLET ORAL ONCE AS NEEDED
Status: CANCELLED | OUTPATIENT
Start: 2020-05-05

## 2020-05-05 RX ORDER — PROMETHAZINE HYDROCHLORIDE 12.5 MG/1
12.5 TABLET ORAL EVERY 4 HOURS PRN
Status: CANCELLED | OUTPATIENT
Start: 2020-05-05 | End: 2020-05-06

## 2020-05-05 RX ORDER — ACETAMINOPHEN 325 MG/1
650 TABLET ORAL EVERY 4 HOURS PRN
Status: CANCELLED | OUTPATIENT
Start: 2020-05-05

## 2020-05-05 RX ORDER — ONDANSETRON 4 MG/1
4 TABLET, FILM COATED ORAL ONCE AS NEEDED
Status: CANCELLED | OUTPATIENT
Start: 2020-05-05

## 2020-05-05 NOTE — PROGRESS NOTES
Chief Complaint   Patient presents with   • Routine Prenatal Visit     No Complaints/concerns, Good fetal , wants to discuss inuction        HPI:   , 39w1d gestation reports doing well    ROS:  See Prenatal Episode/Flowsheet  /70   Wt 81.6 kg (180 lb)   LMP 2019   BMI 31.89 kg/m²      EXAM:  EXTREMITIES:  No swelling-See Prenatal Episode/Flowsheet    ABDOMEN:  FHTs/Movement noted-See Prenatal Episode/Flowsheet    URINE GLUCOSE/PROTEIN:  See Prenatal Episode/Flowsheet    PELVIC EXAM:  See Prenatal Episode/Flowsheet  CV:  Lungs:  GYN:    MDM:    Lab Results   Component Value Date    HGB 12.9 2020    RUBELLAABIGG 2.26 2019    HEPBSAG Negative 2019    ABO B 2019    RH Negative 2019    ABSCRN Negative 2019    [id number] Non Reactive 2019    HEPCVIRUSABY <0.1 2019    STREPGPB Negative 2020    URINECX Final report 2019       U/S:    1. IUP 39w1d  2. Routine care   3. Cervix: 2-3 cm/50%,  Monday 0500 Pit

## 2020-05-07 ENCOUNTER — ANESTHESIA (OUTPATIENT)
Dept: LABOR AND DELIVERY | Facility: HOSPITAL | Age: 25
End: 2020-05-07

## 2020-05-07 ENCOUNTER — HOSPITAL ENCOUNTER (INPATIENT)
Facility: HOSPITAL | Age: 25
LOS: 1 days | Discharge: HOME | End: 2020-05-08
Attending: NURSE PRACTITIONER | Admitting: OBSTETRICS & GYNECOLOGY

## 2020-05-07 ENCOUNTER — ANESTHESIA EVENT (OUTPATIENT)
Dept: LABOR AND DELIVERY | Facility: HOSPITAL | Age: 25
End: 2020-05-07

## 2020-05-07 DIAGNOSIS — Z34.93 THIRD TRIMESTER PREGNANCY: ICD-10-CM

## 2020-05-07 PROBLEM — Z34.90 PREGNANCY: Status: ACTIVE | Noted: 2020-05-07

## 2020-05-07 LAB
ABO GROUP BLD: NORMAL
BASOPHILS # BLD AUTO: 0.04 10*3/MM3 (ref 0–0.2)
BASOPHILS NFR BLD AUTO: 0.3 % (ref 0–1.5)
BILIRUB BLD-MCNC: NEGATIVE MG/DL
BLD GP AB SCN SERPL QL: NEGATIVE
CLARITY, POC: CLEAR
COLOR UR: YELLOW
DEPRECATED RDW RBC AUTO: 43.7 FL (ref 37–54)
EOSINOPHIL # BLD AUTO: 0.11 10*3/MM3 (ref 0–0.4)
EOSINOPHIL NFR BLD AUTO: 0.8 % (ref 0.3–6.2)
ERYTHROCYTE [DISTWIDTH] IN BLOOD BY AUTOMATED COUNT: 12.6 % (ref 12.3–15.4)
GLUCOSE UR STRIP-MCNC: NEGATIVE MG/DL
HCT VFR BLD AUTO: 38.5 % (ref 34–46.6)
HGB BLD-MCNC: 13 G/DL (ref 12–15.9)
IMM GRANULOCYTES # BLD AUTO: 0.06 10*3/MM3 (ref 0–0.05)
IMM GRANULOCYTES NFR BLD AUTO: 0.4 % (ref 0–0.5)
KETONES UR QL: NEGATIVE
LEUKOCYTE EST, POC: ABNORMAL
LYMPHOCYTES # BLD AUTO: 1.42 10*3/MM3 (ref 0.7–3.1)
LYMPHOCYTES NFR BLD AUTO: 10.6 % (ref 19.6–45.3)
MCH RBC QN AUTO: 31.9 PG (ref 26.6–33)
MCHC RBC AUTO-ENTMCNC: 33.8 G/DL (ref 31.5–35.7)
MCV RBC AUTO: 94.6 FL (ref 79–97)
MONOCYTES # BLD AUTO: 0.68 10*3/MM3 (ref 0.1–0.9)
MONOCYTES NFR BLD AUTO: 5.1 % (ref 5–12)
NEUTROPHILS # BLD AUTO: 11.13 10*3/MM3 (ref 1.7–7)
NEUTROPHILS NFR BLD AUTO: 82.8 % (ref 42.7–76)
NITRITE UR-MCNC: NEGATIVE MG/ML
NRBC BLD AUTO-RTO: 0 /100 WBC (ref 0–0.2)
PH UR: 7.5 [PH] (ref 5–8)
PLATELET # BLD AUTO: 139 10*3/MM3 (ref 140–450)
PMV BLD AUTO: 11.5 FL (ref 6–12)
PROT UR STRIP-MCNC: NEGATIVE MG/DL
RBC # BLD AUTO: 4.07 10*6/MM3 (ref 3.77–5.28)
RBC # UR STRIP: NEGATIVE /UL
RH BLD: NEGATIVE
SP GR UR: 1 (ref 1–1.03)
T&S EXPIRATION DATE: NORMAL
UROBILINOGEN UR QL: NORMAL
WBC NRBC COR # BLD: 13.44 10*3/MM3 (ref 3.4–10.8)

## 2020-05-07 PROCEDURE — 59025 FETAL NON-STRESS TEST: CPT | Performed by: NURSE PRACTITIONER

## 2020-05-07 PROCEDURE — 86922 COMPATIBILITY TEST ANTIGLOB: CPT

## 2020-05-07 PROCEDURE — 85025 COMPLETE CBC W/AUTO DIFF WBC: CPT | Performed by: OBSTETRICS & GYNECOLOGY

## 2020-05-07 PROCEDURE — 51703 INSERT BLADDER CATH COMPLEX: CPT

## 2020-05-07 PROCEDURE — C1755 CATHETER, INTRASPINAL: HCPCS | Performed by: NURSE ANESTHETIST, CERTIFIED REGISTERED

## 2020-05-07 PROCEDURE — 81002 URINALYSIS NONAUTO W/O SCOPE: CPT | Performed by: NURSE PRACTITIONER

## 2020-05-07 PROCEDURE — 25010000002 ROPIVACAINE PER 1 MG: Performed by: NURSE ANESTHETIST, CERTIFIED REGISTERED

## 2020-05-07 PROCEDURE — 86900 BLOOD TYPING SEROLOGIC ABO: CPT | Performed by: OBSTETRICS & GYNECOLOGY

## 2020-05-07 PROCEDURE — 25010000002 FENTANYL CITRATE (PF) 250 MCG/5ML SOLUTION 5 ML VIAL: Performed by: NURSE ANESTHETIST, CERTIFIED REGISTERED

## 2020-05-07 PROCEDURE — 86901 BLOOD TYPING SEROLOGIC RH(D): CPT | Performed by: OBSTETRICS & GYNECOLOGY

## 2020-05-07 PROCEDURE — 86920 COMPATIBILITY TEST SPIN: CPT

## 2020-05-07 PROCEDURE — 59025 FETAL NON-STRESS TEST: CPT

## 2020-05-07 PROCEDURE — 86921 COMPATIBILITY TEST INCUBATE: CPT

## 2020-05-07 PROCEDURE — 59410 OBSTETRICAL CARE: CPT | Performed by: NURSE PRACTITIONER

## 2020-05-07 PROCEDURE — 86850 RBC ANTIBODY SCREEN: CPT | Performed by: OBSTETRICS & GYNECOLOGY

## 2020-05-07 RX ORDER — MORPHINE SULFATE 2 MG/ML
6 INJECTION, SOLUTION INTRAMUSCULAR; INTRAVENOUS
Status: DISCONTINUED | OUTPATIENT
Start: 2020-05-07 | End: 2020-05-07 | Stop reason: HOSPADM

## 2020-05-07 RX ORDER — PROMETHAZINE HYDROCHLORIDE 12.5 MG/1
12.5 SUPPOSITORY RECTAL EVERY 4 HOURS PRN
Status: DISCONTINUED | OUTPATIENT
Start: 2020-05-07 | End: 2020-05-07 | Stop reason: HOSPADM

## 2020-05-07 RX ORDER — OXYTOCIN/0.9 % SODIUM CHLORIDE 30/500 ML
650 PLASTIC BAG, INJECTION (ML) INTRAVENOUS ONCE
Status: COMPLETED | OUTPATIENT
Start: 2020-05-07 | End: 2020-05-07

## 2020-05-07 RX ORDER — SODIUM CHLORIDE, SODIUM LACTATE, POTASSIUM CHLORIDE, CALCIUM CHLORIDE 600; 310; 30; 20 MG/100ML; MG/100ML; MG/100ML; MG/100ML
125 INJECTION, SOLUTION INTRAVENOUS CONTINUOUS
Status: DISCONTINUED | OUTPATIENT
Start: 2020-05-07 | End: 2020-05-07

## 2020-05-07 RX ORDER — BUPIVACAINE HYDROCHLORIDE 2.5 MG/ML
INJECTION, SOLUTION EPIDURAL; INFILTRATION; INTRACAUDAL AS NEEDED
Status: DISCONTINUED | OUTPATIENT
Start: 2020-05-07 | End: 2020-05-14 | Stop reason: SURG

## 2020-05-07 RX ORDER — OXYTOCIN/0.9 % SODIUM CHLORIDE 30/500 ML
85 PLASTIC BAG, INJECTION (ML) INTRAVENOUS ONCE
Status: COMPLETED | OUTPATIENT
Start: 2020-05-07 | End: 2020-05-07

## 2020-05-07 RX ORDER — METHYLERGONOVINE MALEATE 0.2 MG/ML
200 INJECTION INTRAVENOUS ONCE AS NEEDED
Status: DISCONTINUED | OUTPATIENT
Start: 2020-05-07 | End: 2020-05-07 | Stop reason: HOSPADM

## 2020-05-07 RX ORDER — CARBOPROST TROMETHAMINE 250 UG/ML
250 INJECTION, SOLUTION INTRAMUSCULAR ONCE AS NEEDED
Status: DISCONTINUED | OUTPATIENT
Start: 2020-05-07 | End: 2020-05-07 | Stop reason: HOSPADM

## 2020-05-07 RX ORDER — ONDANSETRON 4 MG/1
4 TABLET, FILM COATED ORAL EVERY 8 HOURS PRN
Status: DISCONTINUED | OUTPATIENT
Start: 2020-05-07 | End: 2020-05-08 | Stop reason: HOSPADM

## 2020-05-07 RX ORDER — TRISODIUM CITRATE DIHYDRATE AND CITRIC ACID MONOHYDRATE 500; 334 MG/5ML; MG/5ML
30 SOLUTION ORAL ONCE
Status: DISCONTINUED | OUTPATIENT
Start: 2020-05-07 | End: 2020-05-07 | Stop reason: HOSPADM

## 2020-05-07 RX ORDER — PROMETHAZINE HYDROCHLORIDE 25 MG/1
25 TABLET ORAL EVERY 6 HOURS PRN
Status: DISCONTINUED | OUTPATIENT
Start: 2020-05-07 | End: 2020-05-08 | Stop reason: HOSPADM

## 2020-05-07 RX ORDER — MORPHINE SULFATE 2 MG/ML
2 INJECTION, SOLUTION INTRAMUSCULAR; INTRAVENOUS ONCE AS NEEDED
Status: DISCONTINUED | OUTPATIENT
Start: 2020-05-07 | End: 2020-05-07 | Stop reason: HOSPADM

## 2020-05-07 RX ORDER — OXYTOCIN/0.9 % SODIUM CHLORIDE 30/500 ML
2-20 PLASTIC BAG, INJECTION (ML) INTRAVENOUS
Status: DISCONTINUED | OUTPATIENT
Start: 2020-05-07 | End: 2020-05-07 | Stop reason: HOSPADM

## 2020-05-07 RX ORDER — ONDANSETRON 2 MG/ML
4 INJECTION INTRAMUSCULAR; INTRAVENOUS ONCE AS NEEDED
Status: DISCONTINUED | OUTPATIENT
Start: 2020-05-07 | End: 2020-05-07 | Stop reason: HOSPADM

## 2020-05-07 RX ORDER — ONDANSETRON 4 MG/1
4 TABLET, FILM COATED ORAL ONCE AS NEEDED
Status: DISCONTINUED | OUTPATIENT
Start: 2020-05-07 | End: 2020-05-07 | Stop reason: HOSPADM

## 2020-05-07 RX ORDER — IBUPROFEN 600 MG/1
600 TABLET ORAL EVERY 6 HOURS PRN
Status: DISCONTINUED | OUTPATIENT
Start: 2020-05-07 | End: 2020-05-08 | Stop reason: HOSPADM

## 2020-05-07 RX ORDER — BISACODYL 10 MG
10 SUPPOSITORY, RECTAL RECTAL DAILY PRN
Status: DISCONTINUED | OUTPATIENT
Start: 2020-05-08 | End: 2020-05-08 | Stop reason: HOSPADM

## 2020-05-07 RX ORDER — SODIUM CHLORIDE 0.9 % (FLUSH) 0.9 %
1-10 SYRINGE (ML) INJECTION AS NEEDED
Status: DISCONTINUED | OUTPATIENT
Start: 2020-05-07 | End: 2020-05-08 | Stop reason: HOSPADM

## 2020-05-07 RX ORDER — ACETAMINOPHEN 325 MG/1
650 TABLET ORAL EVERY 4 HOURS PRN
Status: DISCONTINUED | OUTPATIENT
Start: 2020-05-07 | End: 2020-05-07 | Stop reason: HOSPADM

## 2020-05-07 RX ORDER — MISOPROSTOL 200 UG/1
800 TABLET ORAL AS NEEDED
Status: DISCONTINUED | OUTPATIENT
Start: 2020-05-07 | End: 2020-05-07 | Stop reason: HOSPADM

## 2020-05-07 RX ORDER — HYDROCODONE BITARTRATE AND ACETAMINOPHEN 5; 325 MG/1; MG/1
1 TABLET ORAL EVERY 4 HOURS PRN
Status: DISCONTINUED | OUTPATIENT
Start: 2020-05-07 | End: 2020-05-08 | Stop reason: HOSPADM

## 2020-05-07 RX ORDER — LIDOCAINE HYDROCHLORIDE 10 MG/ML
5 INJECTION, SOLUTION EPIDURAL; INFILTRATION; INTRACAUDAL; PERINEURAL AS NEEDED
Status: DISCONTINUED | OUTPATIENT
Start: 2020-05-07 | End: 2020-05-07 | Stop reason: HOSPADM

## 2020-05-07 RX ORDER — LIDOCAINE HYDROCHLORIDE 20 MG/ML
INJECTION, SOLUTION INFILTRATION; PERINEURAL AS NEEDED
Status: DISCONTINUED | OUTPATIENT
Start: 2020-05-07 | End: 2020-05-14 | Stop reason: SURG

## 2020-05-07 RX ORDER — PROMETHAZINE HYDROCHLORIDE 25 MG/ML
12.5 INJECTION, SOLUTION INTRAMUSCULAR; INTRAVENOUS EVERY 6 HOURS PRN
Status: DISCONTINUED | OUTPATIENT
Start: 2020-05-07 | End: 2020-05-08 | Stop reason: HOSPADM

## 2020-05-07 RX ORDER — PROMETHAZINE HYDROCHLORIDE 25 MG/ML
12.5 INJECTION, SOLUTION INTRAMUSCULAR; INTRAVENOUS EVERY 4 HOURS PRN
Status: DISCONTINUED | OUTPATIENT
Start: 2020-05-07 | End: 2020-05-07 | Stop reason: HOSPADM

## 2020-05-07 RX ORDER — DOCUSATE SODIUM 100 MG/1
100 CAPSULE, LIQUID FILLED ORAL 2 TIMES DAILY PRN
Status: DISCONTINUED | OUTPATIENT
Start: 2020-05-07 | End: 2020-05-08 | Stop reason: HOSPADM

## 2020-05-07 RX ORDER — HYDROCODONE BITARTRATE AND ACETAMINOPHEN 7.5; 325 MG/1; MG/1
1 TABLET ORAL EVERY 4 HOURS PRN
Status: DISCONTINUED | OUTPATIENT
Start: 2020-05-07 | End: 2020-05-08 | Stop reason: HOSPADM

## 2020-05-07 RX ORDER — DIPHENHYDRAMINE HCL 25 MG
25 CAPSULE ORAL NIGHTLY PRN
Status: DISCONTINUED | OUTPATIENT
Start: 2020-05-07 | End: 2020-05-08 | Stop reason: HOSPADM

## 2020-05-07 RX ORDER — MORPHINE SULFATE 4 MG/ML
4 INJECTION, SOLUTION INTRAMUSCULAR; INTRAVENOUS EVERY 4 HOURS PRN
Status: DISCONTINUED | OUTPATIENT
Start: 2020-05-07 | End: 2020-05-07 | Stop reason: HOSPADM

## 2020-05-07 RX ORDER — PRENATAL VIT/IRON FUM/FOLIC AC 27MG-0.8MG
1 TABLET ORAL DAILY
Status: DISCONTINUED | OUTPATIENT
Start: 2020-05-08 | End: 2020-05-08 | Stop reason: HOSPADM

## 2020-05-07 RX ORDER — SODIUM CHLORIDE 0.9 % (FLUSH) 0.9 %
3 SYRINGE (ML) INJECTION EVERY 12 HOURS SCHEDULED
Status: DISCONTINUED | OUTPATIENT
Start: 2020-05-07 | End: 2020-05-07 | Stop reason: HOSPADM

## 2020-05-07 RX ORDER — PROMETHAZINE HYDROCHLORIDE 12.5 MG/1
12.5 SUPPOSITORY RECTAL EVERY 6 HOURS PRN
Status: DISCONTINUED | OUTPATIENT
Start: 2020-05-07 | End: 2020-05-08 | Stop reason: HOSPADM

## 2020-05-07 RX ORDER — ACETAMINOPHEN 325 MG/1
650 TABLET ORAL ONCE AS NEEDED
Status: DISCONTINUED | OUTPATIENT
Start: 2020-05-07 | End: 2020-05-07 | Stop reason: HOSPADM

## 2020-05-07 RX ORDER — HYDROCORTISONE 25 MG/G
1 CREAM TOPICAL AS NEEDED
Status: DISCONTINUED | OUTPATIENT
Start: 2020-05-07 | End: 2020-05-08 | Stop reason: HOSPADM

## 2020-05-07 RX ORDER — ONDANSETRON 2 MG/ML
4 INJECTION INTRAMUSCULAR; INTRAVENOUS EVERY 6 HOURS PRN
Status: DISCONTINUED | OUTPATIENT
Start: 2020-05-07 | End: 2020-05-08 | Stop reason: HOSPADM

## 2020-05-07 RX ORDER — HYDROCODONE BITARTRATE AND ACETAMINOPHEN 5; 325 MG/1; MG/1
2 TABLET ORAL ONCE AS NEEDED
Status: DISCONTINUED | OUTPATIENT
Start: 2020-05-07 | End: 2020-05-07 | Stop reason: HOSPADM

## 2020-05-07 RX ORDER — MORPHINE SULFATE 4 MG/ML
4 INJECTION, SOLUTION INTRAMUSCULAR; INTRAVENOUS ONCE AS NEEDED
Status: DISCONTINUED | OUTPATIENT
Start: 2020-05-07 | End: 2020-05-07 | Stop reason: HOSPADM

## 2020-05-07 RX ORDER — PROMETHAZINE HYDROCHLORIDE 25 MG/ML
25 INJECTION, SOLUTION INTRAMUSCULAR; INTRAVENOUS EVERY 4 HOURS PRN
Status: DISCONTINUED | OUTPATIENT
Start: 2020-05-07 | End: 2020-05-07 | Stop reason: HOSPADM

## 2020-05-07 RX ORDER — PROMETHAZINE HYDROCHLORIDE 12.5 MG/1
12.5 TABLET ORAL EVERY 4 HOURS PRN
Status: DISCONTINUED | OUTPATIENT
Start: 2020-05-07 | End: 2020-05-07 | Stop reason: HOSPADM

## 2020-05-07 RX ORDER — SODIUM CHLORIDE 0.9 % (FLUSH) 0.9 %
1-10 SYRINGE (ML) INJECTION AS NEEDED
Status: DISCONTINUED | OUTPATIENT
Start: 2020-05-07 | End: 2020-05-07 | Stop reason: HOSPADM

## 2020-05-07 RX ADMIN — BUPIVACAINE HYDROCHLORIDE 3 ML: 2.5 INJECTION, SOLUTION EPIDURAL; INFILTRATION; INTRACAUDAL; PERINEURAL at 15:20

## 2020-05-07 RX ADMIN — ROPIVACAINE HYDROCHLORIDE 12 ML/HR: 2 INJECTION, SOLUTION EPIDURAL; INFILTRATION at 14:30

## 2020-05-07 RX ADMIN — Medication 85 ML/HR: at 16:37

## 2020-05-07 RX ADMIN — BENZOCAINE AND LEVOMENTHOL 1 APPLICATION: 200; 5 SPRAY TOPICAL at 21:31

## 2020-05-07 RX ADMIN — SODIUM CHLORIDE, POTASSIUM CHLORIDE, SODIUM LACTATE AND CALCIUM CHLORIDE 125 ML/HR: 600; 310; 30; 20 INJECTION, SOLUTION INTRAVENOUS at 16:26

## 2020-05-07 RX ADMIN — LIDOCAINE HYDROCHLORIDE 3 ML: 20 INJECTION, SOLUTION INFILTRATION; PERINEURAL at 15:20

## 2020-05-07 RX ADMIN — IBUPROFEN 600 MG: 600 TABLET ORAL at 21:31

## 2020-05-07 RX ADMIN — Medication 650 ML/HR: at 16:05

## 2020-05-07 NOTE — PLAN OF CARE
Problem: Patient Care Overview  Goal: Plan of Care Review  Outcome: Ongoing (interventions implemented as appropriate)  Goal: Individualization and Mutuality  Outcome: Ongoing (interventions implemented as appropriate)  Goal: Discharge Needs Assessment  Outcome: Ongoing (interventions implemented as appropriate)  Goal: Interprofessional Rounds/Family Conf  Outcome: Ongoing (interventions implemented as appropriate)     Problem: Labor (Cervical Ripen, Induct, Augment) (Adult,Obstetrics,Pediatric)  Goal: Signs and Symptoms of Listed Potential Problems Will be Absent, Minimized or Managed (Labor)  Outcome: Ongoing (interventions implemented as appropriate)

## 2020-05-07 NOTE — ANESTHESIA PROCEDURE NOTES
Labor Epidural      Patient location during procedure: OB  Indication:at surgeon's request  Performed By  CRNA: Adolfo Bonilla CRNA  Preanesthetic Checklist  Completed: patient identified, site marked, surgical consent, pre-op evaluation, timeout performed, IV checked, risks and benefits discussed and monitors and equipment checked  Prep:  Pt Position:sitting  Sterile Tech:cap, gloves, mask and sterile barrier  Prep:chlorhexidine gluconate and isopropyl alcohol  Monitoring:blood pressure monitoring and continuous pulse oximetry  Epidural Block Procedure:  Approach:midline  Guidance:landmark technique and palpation technique  Location:L4-L5  Needle Type:Tuohy  Needle Gauge:18 G  Loss of Resistance Medium: saline  Loss of Resistance: 8cm  Cath Depth at skin:12 cm  Paresthesia: none  Aspiration:negative  Test Dose:negative  Number of Attempts: 1  Post Assessment:  Dressing:secured with tape  Pt Tolerance:patient tolerated the procedure well with no apparent complications  Complications:no

## 2020-05-07 NOTE — ANESTHESIA PREPROCEDURE EVALUATION
Anesthesia Evaluation     Patient summary reviewed and Nursing notes reviewed   NPO Solid Status: > 6 hours  NPO Liquid Status: > 4 hours           Airway   Mallampati: I  TM distance: >3 FB  Neck ROM: full  No difficulty expected  Dental - normal exam     Pulmonary - normal exam   (+) a smoker Current Smoked day of surgery,   Cardiovascular - negative cardio ROS and normal exam  Exercise tolerance: excellent (>7 METS)        Neuro/Psych- negative ROS  GI/Hepatic/Renal/Endo - negative ROS     Musculoskeletal (-) negative ROS    Abdominal  - normal exam    Bowel sounds: normal.   Substance History - negative use     OB/GYN    (+) Pregnant,         Other        ROS/Med Hx Other: +thrombocytopenia                  Anesthesia Plan    ASA 2 - emergent     epidural     intravenous induction     Anesthetic plan, all risks, benefits, and alternatives have been provided, discussed and informed consent has been obtained with: patient.    Plan discussed with attending.

## 2020-05-07 NOTE — PLAN OF CARE
Problem: Patient Care Overview  Goal: Plan of Care Review  5/7/2020 1859 by Jessica Thomason RN  Outcome: Ongoing (interventions implemented as appropriate)  5/7/2020 1858 by Jessica Thomason RN  Outcome: Ongoing (interventions implemented as appropriate)  Goal: Individualization and Mutuality  5/7/2020 1859 by Jessica Thomason RN  Outcome: Ongoing (interventions implemented as appropriate)  5/7/2020 1858 by Jessica Thomason RN  Outcome: Ongoing (interventions implemented as appropriate)  Goal: Discharge Needs Assessment  5/7/2020 1859 by Jessica Thomason RN  Outcome: Ongoing (interventions implemented as appropriate)  5/7/2020 1858 by Jessica Thomason RN  Outcome: Ongoing (interventions implemented as appropriate)  Goal: Interprofessional Rounds/Family Conf  5/7/2020 1859 by Jessica Thomason RN  Outcome: Ongoing (interventions implemented as appropriate)  5/7/2020 1859 by Jessica Thomason RN  Outcome: Ongoing (interventions implemented as appropriate)  5/7/2020 1858 by Jessica Thomason RN  Outcome: Ongoing (interventions implemented as appropriate)     Problem: Labor (Cervical Ripen, Induct, Augment) (Adult,Obstetrics,Pediatric)  Goal: Signs and Symptoms of Listed Potential Problems Will be Absent, Minimized or Managed (Labor)  5/7/2020 1859 by Jessica Thomason RN  Outcome: Ongoing (interventions implemented as appropriate)  5/7/2020 1859 by Jessica Thomason RN  Outcome: Ongoing (interventions implemented as appropriate)  5/7/2020 1858 by Jessica Thomason RN  Outcome: Ongoing (interventions implemented as appropriate)

## 2020-05-07 NOTE — L&D DELIVERY NOTE
Robbie  Vaginal Delivery Note    Delivery     Delivery: Vaginal, Spontaneous     YOB: 2021    Time of Birth:  Gestational Age 3:49 PM   39w3d     Anesthesia: Epidural     Delivering clinician: Mally Russell    Forceps?   No   Vacuum? No    Shoulder dystocia present: No        Delivery narrative:     Progressed to complete and pushed well.  of viable male over an intact perineum - spontaneous lusty cry and vigorous - bulb sx'd - to moms abdomen - delayed cord clamping / double clamped & cut per FOB.  Spontaneous delivery of placenta with maternal pushing effort - intact - 20 units pitocin in IVF's infusing - F/F with message.  Vaginal inspection 1st laceration and sutured with chrooo sh.       Infant    Findings: male  infant     Infant observations: Weight: 3402 g (7 lb 8 oz)   Length: 20  in  Observations/Comments:         Apgars: 8   @ 1 minute /    9   @ 5 minutes   Infant Name: Tayden     Placenta, Cord, and Fluid    Placenta delivered  Spontaneous  at   2020  4:05 PM     Cord: 3 vessels  present.   Nuchal Cord?  no   Cord blood obtained: Yes    Cord gases obtained:  No    Cord gas results: Venous:  No results found for: PHCVEN    Arterial:  No results found for: PHCART     Repair    Episiotomy: No - see note above   Lacerations:    Estimated Blood Loss:             Complications  none    Disposition  Mother to Mother Baby/Postpartum  in stable condition currently.  Baby to remains with mom  in stable condition currently.      Mally Russell CNM  20  17:33

## 2020-05-07 NOTE — NON STRESS TEST
"  Alyson James, a  at 39w3d with an KAMALJIT of 2020, by Last Menstrual Period, was seen at [facility] for a nonstress test.    Chief Complaint   Patient presents with   • Contractions     \"started yesterday, but this morning they were tough\"       Patient Active Problem List   Diagnosis   • Late prenatal care   • Rh negative status during pregnancy   • Pregnancy       Start Time: 1155  Stop Time: 1230    NST is reactive              "

## 2020-05-07 NOTE — H&P
[id number] [facility]  Alyson Mittal  : 1995  MRN: [id number]  CSN: [id number]    Chief Complain:  Contractions     History and Physical    Alyson Mittal is a 24 y.o. year old  with an Estimated Date of Delivery: 20 currently at 39w3d presenting with c/o contractions and scant bloody show this AM.  She reports uncertain re: frequency of contractions though seem to be stronger.  Denies fluid leaking.    Reports good FM      Prenatal care has been with [facility].   Prenatal course has been uncomplicated though + for thrombocytopenia -     She reports she had thrombocytopenia with previous pregnancy - recent platelets close wnl  Chart review thrombocytopenia     Total 11 visits   TWG  55 pounds    G1 Proven 8#4       G2 VAVD 6#12 (uncertain why VAVD)    OB History    Para Term  AB Living   3 2 2 0 0 2   SAB TAB Ectopic Molar Multiple Live Births   0 0 0 0 0 2      # Outcome Date GA Lbr Jose/2nd Weight Sex Delivery Anes PTL Lv   3 Current            2 Term 02/10/18 37w3d 04:50 / 00:30  M Vag-Vacuum EPI Y MELINDA      Name: MAGDA MTITAL      Apgar1: 8  Apgar5: 9   1 Term 12 39w0d  3742 g (8 lb 4 oz) M Vag-Spont EPI  MELINDA      Complications: Kidney infection      Obstetric Comments    - Fady    -      Past Medical History:   Diagnosis Date   • Gout    • Rh negative status during pregnancy 2019     Past Surgical History:   Procedure Laterality Date   • WISDOM TOOTH EXTRACTION         Review of Systems        Pertinent items are noted in HPI, all other systems reviewed and negative  No Known Allergies  Social History    Tobacco Use      Smoking status: Current Every Day Smoker        Packs/day: 0.25        Types: Cigarettes        Start date:       Smokeless tobacco: Never Used      Tobacco comment: Smokes about 3 cigarettes per day      /76 (BP Location: Left arm, Patient Position: Sitting)   Pulse (!) 122   Temp 97.8 °F (36.6  °C) (Oral)   Resp 16   LMP 08/05/2019   SpO2 99%     Physical Exam       Psych: Altert and oriented to time, place and person  Mood and affect appropriate   General: well developed; well nourished  uncomfortable with contractions   Musculoskeletal: Normal gait  Full range of motion  Heart: regular rate and rhythm, no murmur  Lungs:  breathing is unlabored  clear to auscultation bilaterally  Back: Negative CVAT  Abdomen: Gravid - soft and non-tender   Contractions every 2-4 min and FHT's 145 + variability   Lower Extremities: Minimal edema and DTR's 2+   V/E: 3-4/70-80%/-1-2 cephalic membranes intact      Prenatal Labs  Lab Results   Component Value Date    HGB 12.9 05/01/2020    HEPBSAG Negative 12/17/2019    ABSCRN Negative 12/17/2019    [id number] Non Reactive 12/17/2019    HEPCVIRUSABY <0.1 12/17/2019    STREPGPB Negative 04/17/2020    URINECX Final report 12/17/2019       Current Labs Reviewed   UA    Assessment:    IUP at 39w3d  Neg GBS  Known + thrombocytopenia   Probable beginning of active labor   FHT's cat 1    Plan:  Evaluate 1 hr after initial exam  Ambulate     1315  C/O contractions getting worse   V/E 4+/stretchy / 80% soft /cephalic   Plan:  Admit to  - [facility]   May have epidural if platelets wnl.    Encouraged questions and answered          Mally Russell CNM  5/7/2020  12:17

## 2020-05-08 VITALS
SYSTOLIC BLOOD PRESSURE: 96 MMHG | TEMPERATURE: 97.7 F | RESPIRATION RATE: 20 BRPM | DIASTOLIC BLOOD PRESSURE: 50 MMHG | HEART RATE: 79 BPM | OXYGEN SATURATION: 97 %

## 2020-05-08 LAB
BASOPHILS # BLD AUTO: 0.05 10*3/MM3 (ref 0–0.2)
BASOPHILS NFR BLD AUTO: 0.4 % (ref 0–1.5)
DEPRECATED RDW RBC AUTO: 43.3 FL (ref 37–54)
EOSINOPHIL # BLD AUTO: 0.22 10*3/MM3 (ref 0–0.4)
EOSINOPHIL NFR BLD AUTO: 1.7 % (ref 0.3–6.2)
ERYTHROCYTE [DISTWIDTH] IN BLOOD BY AUTOMATED COUNT: 12.5 % (ref 12.3–15.4)
HCT VFR BLD AUTO: 35.9 % (ref 34–46.6)
HGB BLD-MCNC: 12.1 G/DL (ref 12–15.9)
IMM GRANULOCYTES # BLD AUTO: 0.06 10*3/MM3 (ref 0–0.05)
IMM GRANULOCYTES NFR BLD AUTO: 0.5 % (ref 0–0.5)
LYMPHOCYTES # BLD AUTO: 2 10*3/MM3 (ref 0.7–3.1)
LYMPHOCYTES NFR BLD AUTO: 15.6 % (ref 19.6–45.3)
MCH RBC QN AUTO: 31.8 PG (ref 26.6–33)
MCHC RBC AUTO-ENTMCNC: 33.7 G/DL (ref 31.5–35.7)
MCV RBC AUTO: 94.2 FL (ref 79–97)
MONOCYTES # BLD AUTO: 0.77 10*3/MM3 (ref 0.1–0.9)
MONOCYTES NFR BLD AUTO: 6 % (ref 5–12)
NEUTROPHILS # BLD AUTO: 9.7 10*3/MM3 (ref 1.7–7)
NEUTROPHILS NFR BLD AUTO: 75.8 % (ref 42.7–76)
NRBC BLD AUTO-RTO: 0 /100 WBC (ref 0–0.2)
PLATELET # BLD AUTO: 128 10*3/MM3 (ref 140–450)
PMV BLD AUTO: 12 FL (ref 6–12)
RBC # BLD AUTO: 3.81 10*6/MM3 (ref 3.77–5.28)
WBC NRBC COR # BLD: 12.8 10*3/MM3 (ref 3.4–10.8)

## 2020-05-08 PROCEDURE — 85025 COMPLETE CBC W/AUTO DIFF WBC: CPT | Performed by: NURSE PRACTITIONER

## 2020-05-08 PROCEDURE — 99024 POSTOP FOLLOW-UP VISIT: CPT | Performed by: MIDWIFE

## 2020-05-08 RX ORDER — HYDROCODONE BITARTRATE AND ACETAMINOPHEN 5; 325 MG/1; MG/1
1 TABLET ORAL EVERY 4 HOURS PRN
Qty: 12 TABLET | Refills: 0 | Status: SHIPPED | OUTPATIENT
Start: 2020-05-08

## 2020-05-08 RX ORDER — IBUPROFEN 600 MG/1
600 TABLET ORAL EVERY 6 HOURS PRN
Qty: 60 TABLET | Refills: 0 | Status: SHIPPED | OUTPATIENT
Start: 2020-05-08

## 2020-05-08 RX ADMIN — HYDROCODONE BITARTRATE AND ACETAMINOPHEN 1 TABLET: 5; 325 TABLET ORAL at 07:46

## 2020-05-08 RX ADMIN — PRENATAL VITAMINS-IRON FUMARATE 27 MG IRON-FOLIC ACID 0.8 MG TABLET 1 TABLET: at 09:17

## 2020-05-08 RX ADMIN — IBUPROFEN 600 MG: 600 TABLET ORAL at 05:14

## 2020-05-08 RX ADMIN — HYDROCODONE BITARTRATE AND ACETAMINOPHEN 1 TABLET: 5; 325 TABLET ORAL at 07:00

## 2020-05-08 RX ADMIN — HYDROCODONE BITARTRATE AND ACETAMINOPHEN 1 TABLET: 5; 325 TABLET ORAL at 02:00

## 2020-05-08 NOTE — DISCHARGE SUMMARY
Discharge Summary     Robbie James  : 1995  MRN: [id number]  CSN: [id number]    Date of Admission: 2020   Date of Discharge:  2020   Delivering Physician: Mally Russell        Admission Diagnosis: 1. Pregnancy [Z34.90]   Discharge Diagnosis: 1. Same as above plus  2. Pregnancy at 39w3d - delivered       Procedures: 2020  - Vaginal, Spontaneous       Hospital Course  Patient is a 24 y.o.  who at 39w3d had a vaginal birth.  Her postpartum course was without complications.  On PPD #1 she was ready for discharge.  She had normal lochia and pain well controlled with oral medications. She is bottle feeding    Infant  male  fetus weighing 3402 g (7 lb 8 oz)   Apgars -  8  @ 1 minute /  9  @ 5 minutes.    Discharge labs  Lab Results   Component Value Date    WBC 12.80 (H) 2020    HGB 12.1 2020    HCT 35.9 2020     (L) 2020       Discharge Medications     Discharge Medications      Changes to Medications      Instructions Start Date   HYDROcodone-acetaminophen 5-325 MG per tablet  Commonly known as:  NORCO  What changed:    · when to take this  · reasons to take this   1 tablet, Oral, Every 4 Hours PRN      ibuprofen 600 MG tablet  Commonly known as:  ADVILMOTRIN  What changed:  reasons to take this   600 mg, Oral, Every 6 Hours PRN         Continue These Medications      Instructions Start Date   Boostrix 5-2.5-18.5 LF-MCG/0.5 injection  Generic drug:  Tdap   Intramuscular      docusate sodium 100 MG capsule   100 mg, Oral, 2 Times Daily PRN      famotidine 20 MG tablet  Commonly known as:  Pepcid   20 mg, Oral, Daily      magnesium oxide 400 (241.3 Mg) MG tablet tablet  Commonly known as:  MAGOX   No dose, route, or frequency recorded.      magnesium oxide 400 MG tablet  Commonly known as:  MAG-OX   400 mg, Oral, Daily      omeprazole 20 MG capsule  Commonly known as:  priLOSEC   20 mg, Oral, Daily      prenatal (CLASSIC) vitamin 28-0.8 MG tablet  tablet   1 tablet, Oral, Daily      promethazine 25 MG tablet  Commonly known as:  PHENERGAN   25 mg, Oral, Every 6 Hours PRN      sennosides-docusate 8.6-50 MG per tablet  Commonly known as:  PERICOLACE   2 tablets, Oral, Daily             Discharge Disposition Home or Self Care   Condition on Discharge: good   Follow-up: 6 weeks with DANDRE Avalos     Time spent on discharge: 30 minutes or less  Yani Barriga, APRN  5/8/2020

## 2020-05-08 NOTE — PLAN OF CARE
Problem: Patient Care Overview  Goal: Plan of Care Review  Outcome: Ongoing (interventions implemented as appropriate)  Flowsheets (Taken 5/8/2020 7689)  Progress: improving  Plan of Care Reviewed With: patient  Outcome Summary: VSS. Adequate I/O's. Adequate pain control.

## 2020-05-11 LAB
BH BB BLOOD EXPIRATION DATE: NORMAL
BH BB BLOOD EXPIRATION DATE: NORMAL
BH BB BLOOD TYPE BARCODE: 1700
BH BB BLOOD TYPE BARCODE: 1700
BH BB DISPENSE STATUS: NORMAL
BH BB DISPENSE STATUS: NORMAL
BH BB PRODUCT CODE: NORMAL
BH BB PRODUCT CODE: NORMAL
BH BB UNIT NUMBER: NORMAL
BH BB UNIT NUMBER: NORMAL
CROSSMATCH INTERPRETATION: NORMAL
CROSSMATCH INTERPRETATION: NORMAL
UNIT  ABO: NORMAL
UNIT  ABO: NORMAL
UNIT  RH: NORMAL
UNIT  RH: NORMAL

## 2020-05-14 NOTE — ANESTHESIA POSTPROCEDURE EVALUATION
Patient: Alyson James    Procedure Summary     Date:  05/07/20 Room / Location:      Anesthesia Start:  1409 Anesthesia Stop:  1559    Procedure:  LABOR ANALGESIA Diagnosis:      Scheduled Providers:   Provider:  Adolfo Bonilla CRNA    Anesthesia Type:  epidural ASA Status:  2 - Emergent          Anesthesia Type: epidural    Vitals  Vitals Value Taken Time   BP 96/50 5/8/2020  3:36 PM   Temp 97.7 °F (36.5 °C) 5/8/2020  3:36 PM   Pulse 79 5/8/2020  3:36 PM   Resp 20 5/8/2020  3:36 PM   SpO2 97 % 5/8/2020  3:36 PM           Post Anesthesia Care and Evaluation    Patient location during evaluation: bedside  Patient participation: complete - patient participated  Level of consciousness: awake and alert  Pain management: satisfactory to patient  Airway patency: patent  Anesthetic complications: No anesthetic complications    Cardiovascular status: acceptable and hemodynamically stable  Respiratory status: acceptable  Hydration status: acceptable